# Patient Record
Sex: MALE | Race: WHITE | URBAN - METROPOLITAN AREA
[De-identification: names, ages, dates, MRNs, and addresses within clinical notes are randomized per-mention and may not be internally consistent; named-entity substitution may affect disease eponyms.]

---

## 2017-07-31 RX ORDER — MULTIVITAMIN
1 CAPSULE ORAL DAILY
COMMUNITY
Start: 2007-03-21

## 2017-07-31 RX ORDER — METFORMIN HYDROCHLORIDE 500 MG/1
TABLET, EXTENDED RELEASE ORAL
Qty: 30 TABLET | Refills: 1 | Status: SHIPPED | OUTPATIENT
Start: 2017-07-31 | End: 2017-09-29

## 2017-07-31 RX ORDER — ENALAPRIL MALEATE 10 MG/1
1 TABLET ORAL DAILY
COMMUNITY
Start: 2007-03-21 | End: 2017-08-28

## 2017-07-31 RX ORDER — ATORVASTATIN CALCIUM 80 MG/1
0.5 TABLET, FILM COATED ORAL NIGHTLY
COMMUNITY
Start: 2007-03-21 | End: 2017-10-23

## 2017-07-31 RX ORDER — METFORMIN HYDROCHLORIDE 500 MG/1
1 TABLET, EXTENDED RELEASE ORAL EVERY EVENING
COMMUNITY
Start: 2007-03-21 | End: 2017-10-23

## 2017-07-31 RX ORDER — ATORVASTATIN CALCIUM 80 MG/1
TABLET, FILM COATED ORAL
Qty: 15 TABLET | Refills: 1 | Status: SHIPPED | OUTPATIENT
Start: 2017-07-31 | End: 2017-09-29

## 2017-08-29 RX ORDER — ENALAPRIL MALEATE 10 MG/1
TABLET ORAL
Qty: 30 TABLET | Refills: 0 | Status: SHIPPED | OUTPATIENT
Start: 2017-08-29 | End: 2017-09-29

## 2017-09-30 RX ORDER — METFORMIN HYDROCHLORIDE 500 MG/1
TABLET, EXTENDED RELEASE ORAL
Qty: 30 TABLET | Refills: 0 | Status: SHIPPED | OUTPATIENT
Start: 2017-09-30 | End: 2017-10-23

## 2017-09-30 RX ORDER — ATORVASTATIN CALCIUM 80 MG/1
TABLET, FILM COATED ORAL
Qty: 15 TABLET | Refills: 0 | Status: SHIPPED | OUTPATIENT
Start: 2017-09-30 | End: 2017-10-23

## 2017-09-30 RX ORDER — ENALAPRIL MALEATE 10 MG/1
TABLET ORAL
Qty: 30 TABLET | Refills: 0 | Status: SHIPPED | OUTPATIENT
Start: 2017-09-30 | End: 2017-10-23

## 2017-09-30 NOTE — TELEPHONE ENCOUNTER
All medications sent in for 1 month. Please call him and ask him to make an appointment before further refills will be given.

## 2017-09-30 NOTE — TELEPHONE ENCOUNTER
Last RF-  Enalapril: 8/29/17  Atorvastatin: 7/31/17  Metformin: 7/31/17    Future appt:  none  Last Appointment:  8/12/16  Last labs: 8/9/16 (Lipid Panel, Uric Acid, CMP, CBC, A1C)

## 2017-10-16 ENCOUNTER — TELEPHONE (OUTPATIENT)
Dept: FAMILY MEDICINE CLINIC | Facility: CLINIC | Age: 72
End: 2017-10-16

## 2017-10-16 DIAGNOSIS — E11.9 CONTROLLED TYPE 2 DIABETES MELLITUS WITHOUT COMPLICATION, WITHOUT LONG-TERM CURRENT USE OF INSULIN (HCC): Primary | ICD-10-CM

## 2017-10-16 DIAGNOSIS — I10 HTN (HYPERTENSION), BENIGN: ICD-10-CM

## 2017-10-16 NOTE — TELEPHONE ENCOUNTER
Needs orders for lab work for upcoming px on monday 10/23/17.  Pt wants to be informed once orders are placed so that he can make lab appointment

## 2017-10-16 NOTE — TELEPHONE ENCOUNTER
Patient called and was wondering about the Vitamin D blood test and if the doctor would order it and it get paid by medicare.

## 2017-10-19 ENCOUNTER — LABORATORY ENCOUNTER (OUTPATIENT)
Dept: LAB | Age: 72
End: 2017-10-19
Attending: FAMILY MEDICINE
Payer: MEDICARE

## 2017-10-19 DIAGNOSIS — I10 HTN (HYPERTENSION), BENIGN: ICD-10-CM

## 2017-10-19 DIAGNOSIS — E11.9 CONTROLLED TYPE 2 DIABETES MELLITUS WITHOUT COMPLICATION, WITHOUT LONG-TERM CURRENT USE OF INSULIN (HCC): ICD-10-CM

## 2017-10-19 PROCEDURE — 82570 ASSAY OF URINE CREATININE: CPT

## 2017-10-19 PROCEDURE — 36415 COLL VENOUS BLD VENIPUNCTURE: CPT

## 2017-10-19 PROCEDURE — 82043 UR ALBUMIN QUANTITATIVE: CPT

## 2017-10-19 PROCEDURE — 83036 HEMOGLOBIN GLYCOSYLATED A1C: CPT

## 2017-10-19 PROCEDURE — 80053 COMPREHEN METABOLIC PANEL: CPT

## 2017-10-19 PROCEDURE — 84443 ASSAY THYROID STIM HORMONE: CPT

## 2017-10-19 PROCEDURE — 82306 VITAMIN D 25 HYDROXY: CPT

## 2017-10-19 PROCEDURE — 85025 COMPLETE CBC W/AUTO DIFF WBC: CPT

## 2017-10-19 PROCEDURE — 84550 ASSAY OF BLOOD/URIC ACID: CPT

## 2017-10-19 PROCEDURE — 80061 LIPID PANEL: CPT

## 2017-10-20 ENCOUNTER — TELEPHONE (OUTPATIENT)
Dept: FAMILY MEDICINE CLINIC | Facility: CLINIC | Age: 72
End: 2017-10-20

## 2017-10-20 NOTE — TELEPHONE ENCOUNTER
Patient questioned if lab testing back. Informed yes. Asking if anything very abnormal on testing-informed no. Informed Dr Raquel Gay will review labs further at time of appt on Monday/agreed.   Carson Rucker, 10/20/17, 10:54 AM

## 2017-10-23 ENCOUNTER — OFFICE VISIT (OUTPATIENT)
Dept: FAMILY MEDICINE CLINIC | Facility: CLINIC | Age: 72
End: 2017-10-23

## 2017-10-23 VITALS
RESPIRATION RATE: 16 BRPM | WEIGHT: 210.5 LBS | SYSTOLIC BLOOD PRESSURE: 132 MMHG | DIASTOLIC BLOOD PRESSURE: 78 MMHG | TEMPERATURE: 98 F | HEIGHT: 65 IN | HEART RATE: 86 BPM | BODY MASS INDEX: 35.07 KG/M2

## 2017-10-23 DIAGNOSIS — Z00.00 ENCOUNTER FOR ANNUAL HEALTH EXAMINATION: Primary | ICD-10-CM

## 2017-10-23 DIAGNOSIS — E78.00 PURE HYPERCHOLESTEROLEMIA: ICD-10-CM

## 2017-10-23 DIAGNOSIS — Z12.11 ENCOUNTER FOR SCREENING COLONOSCOPY: ICD-10-CM

## 2017-10-23 DIAGNOSIS — E11.21 DIABETIC NEPHROPATHY ASSOCIATED WITH TYPE 2 DIABETES MELLITUS (HCC): ICD-10-CM

## 2017-10-23 DIAGNOSIS — I10 BENIGN ESSENTIAL HYPERTENSION: ICD-10-CM

## 2017-10-23 DIAGNOSIS — E11.9 CONTROLLED TYPE 2 DIABETES MELLITUS WITHOUT COMPLICATION, WITHOUT LONG-TERM CURRENT USE OF INSULIN (HCC): ICD-10-CM

## 2017-10-23 PROCEDURE — G0439 PPPS, SUBSEQ VISIT: HCPCS | Performed by: FAMILY MEDICINE

## 2017-10-23 PROCEDURE — G0008 ADMIN INFLUENZA VIRUS VAC: HCPCS | Performed by: FAMILY MEDICINE

## 2017-10-23 PROCEDURE — 90653 IIV ADJUVANT VACCINE IM: CPT | Performed by: FAMILY MEDICINE

## 2017-10-23 PROCEDURE — 99214 OFFICE O/P EST MOD 30 MIN: CPT | Performed by: FAMILY MEDICINE

## 2017-10-23 RX ORDER — METFORMIN HYDROCHLORIDE 500 MG/1
500 TABLET, EXTENDED RELEASE ORAL
Qty: 90 TABLET | Refills: 1 | Status: SHIPPED | OUTPATIENT
Start: 2017-10-23 | End: 2018-04-20

## 2017-10-23 RX ORDER — ENALAPRIL MALEATE 10 MG/1
10 TABLET ORAL DAILY
Qty: 90 TABLET | Refills: 1 | Status: SHIPPED | OUTPATIENT
Start: 2017-10-23 | End: 2018-04-20

## 2017-10-23 RX ORDER — ATORVASTATIN CALCIUM 80 MG/1
40 TABLET, FILM COATED ORAL DAILY
Qty: 45 TABLET | Refills: 1 | Status: SHIPPED | OUTPATIENT
Start: 2017-10-23 | End: 2018-04-20

## 2017-10-23 NOTE — PATIENT INSTRUCTIONS
Please schedule colonoscopy with Dr Ruben Guerrero. Thom in 6 months. Recommended Websites for Advanced Directives    [email or web address]. org/publications/Documents/personal_[email or web address]. pdf  An information packet, including necessary form from the [employer]

## 2017-10-23 NOTE — PROGRESS NOTES
[place] [facility] [place]  PROGRESS NOTE  Chief Complaint:   Patient presents with:  Physical      HPI:   This is a 70year old male coming in for his annual physical.  He said that he has been feeling good overall.   He denies any new problems or issue HGB 16.7 13.0 - 17.0 g/dL   HCT 48.7 37.0 - 53.0 %   .0 150.0 - 450.0 10(3)uL   MCV 93.5 80.0 - 99.0 fL   MCH 32.1 27.0 - 33.2 pg   MCHC 34.3 31.0 - 37.0 g/dL   RDW 12.3 11.5 - 16.0 %   RDW-SD 42.5 35.1 - 46.3 fL   Neutrophil Absolute Prelim 3.69 capsule by mouth daily. Disp:  Rfl:       Counseling given: Not Answered       REVIEW OF SYSTEMS:   CONSTITUTIONAL:  Denies unusual weight gain/loss, fever, chills, or fatigue.   EENT:  Eyes:  Denies eye pain, visual loss, blurred vision, double vision or y icterus, conjunctivae clear bilaterally, no eye discharge Ears: External normal. Nose: patent, no nasal discharge Throat:  No tonsillar erythema or exudate. Mouth:  No oral lesions or ulcerations, good dentition.   NECK: Supple, no CLAD, no JVD, no thyrome type 2 diabetes mellitus ([facility] [address]  He does have a history of elevated microalbumin to creatinine ratio. He is on enalapril to treat that. His GFR is greater than 60. Plan: Continue to take the enalapril as ordered. Recheck in 6 months.   Schedule colonos

## 2018-04-20 RX ORDER — METFORMIN HYDROCHLORIDE 500 MG/1
TABLET, EXTENDED RELEASE ORAL
Qty: 30 TABLET | Refills: 1 | Status: SHIPPED | OUTPATIENT
Start: 2018-04-20 | End: 2018-06-18

## 2018-04-20 RX ORDER — ATORVASTATIN CALCIUM 80 MG/1
TABLET, FILM COATED ORAL
Qty: 15 TABLET | Refills: 1 | Status: SHIPPED | OUTPATIENT
Start: 2018-04-20 | End: 2018-06-18

## 2018-04-20 RX ORDER — ENALAPRIL MALEATE 10 MG/1
TABLET ORAL
Qty: 30 TABLET | Refills: 1 | Status: SHIPPED | OUTPATIENT
Start: 2018-04-20 | End: 2018-06-18

## 2018-04-20 NOTE — TELEPHONE ENCOUNTER
Future appt:     Your appointments     Date & Time Appointment Department [facility])    Apr 23, 2018  8:00 AM CDT Laboratory Visit with REF Dong Mealing [facility] ([facility] Ref Lab Arvind Espino)    Apr 25, 2018  8:00 AM CDT Follow up - Extended with Simeon Pierre

## 2018-04-25 ENCOUNTER — MED REC SCAN ONLY (OUTPATIENT)
Dept: FAMILY MEDICINE CLINIC | Facility: CLINIC | Age: 73
End: 2018-04-25

## 2018-04-25 ENCOUNTER — APPOINTMENT (OUTPATIENT)
Dept: LAB | Age: 73
End: 2018-04-25
Attending: FAMILY MEDICINE
Payer: MEDICARE

## 2018-04-25 DIAGNOSIS — E11.9 CONTROLLED TYPE 2 DIABETES MELLITUS WITHOUT COMPLICATION, WITHOUT LONG-TERM CURRENT USE OF INSULIN (HCC): ICD-10-CM

## 2018-04-25 PROCEDURE — 36415 COLL VENOUS BLD VENIPUNCTURE: CPT

## 2018-04-25 PROCEDURE — 80053 COMPREHEN METABOLIC PANEL: CPT

## 2018-04-25 PROCEDURE — 83036 HEMOGLOBIN GLYCOSYLATED A1C: CPT

## 2018-04-30 ENCOUNTER — OFFICE VISIT (OUTPATIENT)
Dept: FAMILY MEDICINE CLINIC | Facility: CLINIC | Age: 73
End: 2018-04-30

## 2018-04-30 VITALS
TEMPERATURE: 97 F | DIASTOLIC BLOOD PRESSURE: 82 MMHG | WEIGHT: 206.19 LBS | HEIGHT: 65.5 IN | RESPIRATION RATE: 16 BRPM | SYSTOLIC BLOOD PRESSURE: 142 MMHG | BODY MASS INDEX: 33.94 KG/M2 | HEART RATE: 84 BPM

## 2018-04-30 DIAGNOSIS — I10 BENIGN ESSENTIAL HYPERTENSION: ICD-10-CM

## 2018-04-30 DIAGNOSIS — E11.21 DIABETIC NEPHROPATHY ASSOCIATED WITH TYPE 2 DIABETES MELLITUS (HCC): ICD-10-CM

## 2018-04-30 DIAGNOSIS — E78.00 PURE HYPERCHOLESTEROLEMIA: ICD-10-CM

## 2018-04-30 DIAGNOSIS — K08.109 EDENTULOUS: ICD-10-CM

## 2018-04-30 DIAGNOSIS — E11.9 CONTROLLED TYPE 2 DIABETES MELLITUS WITHOUT COMPLICATION, WITHOUT LONG-TERM CURRENT USE OF INSULIN (HCC): Primary | ICD-10-CM

## 2018-04-30 PROCEDURE — 99214 OFFICE O/P EST MOD 30 MIN: CPT | Performed by: FAMILY MEDICINE

## 2018-04-30 NOTE — PROGRESS NOTES
[facility] [place]  PROGRESS NOTE  Chief Complaint:   Patient presents with:  Diabetes  Follow - Up      HPI:   This is a 67year old male coming in for diabetes follow-up. He denies any new problems or concerns.     He is not having any problem Mother    • Dementia Mother      Allergies:    No Known Allergies        Current Meds:    Current Outpatient Prescriptions:  METFORMIN HCL  MG Oral Tablet 24 Hr TAKE ONE TABLET BY MOUTH ONE TIME DAILY with breakfast Disp: 30 tablet Rfl: 1   ENALAPRIL °F (36.2 °C) (Tympanic)   Resp 16   Ht 65.5\"   Wt 206 lb 3.2 oz   BMI 33.79 kg/m²  Estimated body mass index is 33.79 kg/m² as calculated from the following:    Height as of this encounter: 65.5\". Weight as of this encounter: 206 lb 3.2 oz.    Vital si at this point. 3. Edentulous  He had his last teeth removed. He is now completely edentulous. 4. Benign essential hypertension  His blood pressure is slightly elevated today but is relatively anxious today as well.     5. Pure hypercholesterolemia  H

## 2018-06-19 RX ORDER — ATORVASTATIN CALCIUM 80 MG/1
40 TABLET, FILM COATED ORAL DAILY
Qty: 45 TABLET | Refills: 3 | Status: SHIPPED | OUTPATIENT
Start: 2018-06-19 | End: 2019-06-26

## 2018-06-19 RX ORDER — METFORMIN HYDROCHLORIDE 500 MG/1
TABLET, EXTENDED RELEASE ORAL
Qty: 90 TABLET | Refills: 3 | Status: SHIPPED | OUTPATIENT
Start: 2018-06-19 | End: 2019-05-28

## 2018-06-19 RX ORDER — ENALAPRIL MALEATE 10 MG/1
TABLET ORAL
Qty: 90 TABLET | Refills: 3 | Status: SHIPPED | OUTPATIENT
Start: 2018-06-19 | End: 2019-05-28

## 2018-06-19 NOTE — TELEPHONE ENCOUNTER
Future appt:     Your appointments     Date & Time Appointment Department [facility])    Oct 25, 2018  8:15 AM CDT Laboratory Visit with REF Franchesca Hicks Reference Lab ([facility] Ref Lab [facility])    Oct 29, 2018  8:00 AM CDT Follow up with Airam August MD

## 2018-07-12 ENCOUNTER — TELEPHONE (OUTPATIENT)
Dept: FAMILY MEDICINE CLINIC | Facility: CLINIC | Age: 73
End: 2018-07-12

## 2018-07-16 NOTE — TELEPHONE ENCOUNTER
Patient looking for new Eye Dr. Sunday Bernalures [facility] Looking for Recommendation. Patient informed Tolna/Ted or Dr Delfino Jacobsen/agrees.

## 2018-10-22 ENCOUNTER — TELEPHONE (OUTPATIENT)
Dept: FAMILY MEDICINE CLINIC | Facility: CLINIC | Age: 73
End: 2018-10-22

## 2018-10-22 DIAGNOSIS — E11.9 CONTROLLED TYPE 2 DIABETES MELLITUS WITHOUT COMPLICATION, WITHOUT LONG-TERM CURRENT USE OF INSULIN (HCC): ICD-10-CM

## 2018-10-22 DIAGNOSIS — K08.109 EDENTULOUS: ICD-10-CM

## 2018-10-22 DIAGNOSIS — E78.00 PURE HYPERCHOLESTEROLEMIA: Primary | ICD-10-CM

## 2018-10-22 DIAGNOSIS — E11.21 DIABETIC NEPHROPATHY ASSOCIATED WITH TYPE 2 DIABETES MELLITUS (HCC): ICD-10-CM

## 2018-10-22 DIAGNOSIS — I10 BENIGN ESSENTIAL HYPERTENSION: ICD-10-CM

## 2018-10-22 DIAGNOSIS — E66.09 CLASS 1 OBESITY DUE TO EXCESS CALORIES WITH SERIOUS COMORBIDITY AND BODY MASS INDEX (BMI) OF 33.0 TO 33.9 IN ADULT: ICD-10-CM

## 2018-10-25 ENCOUNTER — LABORATORY ENCOUNTER (OUTPATIENT)
Dept: LAB | Age: 73
End: 2018-10-25
Attending: FAMILY MEDICINE
Payer: MEDICARE

## 2018-10-25 DIAGNOSIS — E11.21 DIABETIC NEPHROPATHY ASSOCIATED WITH TYPE 2 DIABETES MELLITUS (HCC): ICD-10-CM

## 2018-10-25 DIAGNOSIS — E11.9 CONTROLLED TYPE 2 DIABETES MELLITUS WITHOUT COMPLICATION, WITHOUT LONG-TERM CURRENT USE OF INSULIN (HCC): ICD-10-CM

## 2018-10-25 DIAGNOSIS — I10 BENIGN ESSENTIAL HYPERTENSION: ICD-10-CM

## 2018-10-25 DIAGNOSIS — E66.09 CLASS 1 OBESITY DUE TO EXCESS CALORIES WITH SERIOUS COMORBIDITY AND BODY MASS INDEX (BMI) OF 33.0 TO 33.9 IN ADULT: ICD-10-CM

## 2018-10-25 DIAGNOSIS — E78.00 PURE HYPERCHOLESTEROLEMIA: ICD-10-CM

## 2018-10-25 DIAGNOSIS — K08.109 EDENTULOUS: ICD-10-CM

## 2018-10-25 PROCEDURE — 83036 HEMOGLOBIN GLYCOSYLATED A1C: CPT

## 2018-10-25 PROCEDURE — 82570 ASSAY OF URINE CREATININE: CPT

## 2018-10-25 PROCEDURE — 82306 VITAMIN D 25 HYDROXY: CPT

## 2018-10-25 PROCEDURE — 80061 LIPID PANEL: CPT

## 2018-10-25 PROCEDURE — 36415 COLL VENOUS BLD VENIPUNCTURE: CPT

## 2018-10-25 PROCEDURE — 85025 COMPLETE CBC W/AUTO DIFF WBC: CPT

## 2018-10-25 PROCEDURE — 84550 ASSAY OF BLOOD/URIC ACID: CPT

## 2018-10-25 PROCEDURE — 82043 UR ALBUMIN QUANTITATIVE: CPT

## 2018-10-25 PROCEDURE — 80053 COMPREHEN METABOLIC PANEL: CPT

## 2018-10-25 PROCEDURE — 84443 ASSAY THYROID STIM HORMONE: CPT

## 2018-10-26 ENCOUNTER — TELEPHONE (OUTPATIENT)
Dept: FAMILY MEDICINE CLINIC | Facility: CLINIC | Age: 73
End: 2018-10-26

## 2018-10-26 NOTE — TELEPHONE ENCOUNTER
Per Neema LEHMAN-Labs stable will review at time of visit with Dr Antoine Wheeler.   Alicia Cortez, 10/26/18, 9:27 AM

## 2018-10-26 NOTE — TELEPHONE ENCOUNTER
----- Message from GEOVANY Kebede sent at 10/26/2018  8:37 AM CDT -----  Dr. Isabelle Heck patient–has appointment with him coming up on October 29–can review labs at that time. Please notify patient labs are stable A1c is 6.2.

## 2018-10-29 ENCOUNTER — OFFICE VISIT (OUTPATIENT)
Dept: FAMILY MEDICINE CLINIC | Facility: CLINIC | Age: 73
End: 2018-10-29
Payer: MEDICARE

## 2018-10-29 VITALS
WEIGHT: 207.81 LBS | TEMPERATURE: 98 F | HEART RATE: 88 BPM | HEIGHT: 66.5 IN | DIASTOLIC BLOOD PRESSURE: 74 MMHG | BODY MASS INDEX: 33 KG/M2 | SYSTOLIC BLOOD PRESSURE: 152 MMHG | RESPIRATION RATE: 16 BRPM

## 2018-10-29 DIAGNOSIS — E11.21 DIABETIC NEPHROPATHY ASSOCIATED WITH TYPE 2 DIABETES MELLITUS (HCC): ICD-10-CM

## 2018-10-29 DIAGNOSIS — K08.109 EDENTULOUS: ICD-10-CM

## 2018-10-29 DIAGNOSIS — Z23 NEED FOR VACCINATION: ICD-10-CM

## 2018-10-29 DIAGNOSIS — E11.9 CONTROLLED TYPE 2 DIABETES MELLITUS WITHOUT COMPLICATION, WITHOUT LONG-TERM CURRENT USE OF INSULIN (HCC): Primary | ICD-10-CM

## 2018-10-29 DIAGNOSIS — Z00.00 ENCOUNTER FOR ANNUAL HEALTH EXAMINATION: ICD-10-CM

## 2018-10-29 DIAGNOSIS — E78.00 PURE HYPERCHOLESTEROLEMIA: ICD-10-CM

## 2018-10-29 DIAGNOSIS — I10 BENIGN ESSENTIAL HYPERTENSION: ICD-10-CM

## 2018-10-29 PROCEDURE — G0008 ADMIN INFLUENZA VIRUS VAC: HCPCS | Performed by: FAMILY MEDICINE

## 2018-10-29 PROCEDURE — 99214 OFFICE O/P EST MOD 30 MIN: CPT | Performed by: FAMILY MEDICINE

## 2018-10-29 PROCEDURE — G0439 PPPS, SUBSEQ VISIT: HCPCS | Performed by: FAMILY MEDICINE

## 2018-10-29 PROCEDURE — 90653 IIV ADJUVANT VACCINE IM: CPT | Performed by: FAMILY MEDICINE

## 2018-10-29 RX ORDER — AMLODIPINE BESYLATE 5 MG/1
5 TABLET ORAL DAILY
Qty: 30 TABLET | Refills: 5 | Status: SHIPPED | OUTPATIENT
Start: 2018-10-29

## 2018-10-29 NOTE — PATIENT INSTRUCTIONS
Recommended Websites for Advanced Directives    [email or web address].no. org/publications/Documents/personal_dec. pdf  An information packet, including necessary form from the [address] website. http://[email or web address] [place]state. [place]/public/books/adv

## 2018-10-29 NOTE — PROGRESS NOTES
[facility] [place]  PROGRESS NOTE  Chief Complaint:   Patient presents with:  Physical      HPI:   This is a 67year old male coming in for his annual Medicare wellness exam.  He he said that he has been feeling well.   He denies any new problems VITAMIN D, 25-HYDROXY   Result Value Ref Range    25-Hydroxyvitamin D (Total) 27.3 (L) 30.0 - 100.0 ng/mL   CBC W/ DIFFERENTIAL   Result Value Ref Range    WBC 5.8 4.0 - 13.0 x10(3) uL    RBC 5.13 3.80 - 5.80 x10(6)uL    HGB 16.4 13.0 - 17.0 g/dL    HCT mouth daily. Disp: 30 tablet Rfl: 5   Cholecalciferol (VITAMIN D) 1000 units Oral Tab Take 1,000 Int'l Units by mouth daily.  Disp:  Rfl: 0   ENALAPRIL MALEATE 10 MG Oral Tab TAKE ONE TABLET BY MOUTH ONE TIME DAILY  Disp: 90 tablet Rfl: 3   atorvastatin 80 Pulse 88   Temp 97.5 °F (36.4 °C) (Tympanic)   Resp 16   Ht 66.5\"   Wt 207 lb 12.8 oz   BMI 33.04 kg/m²  Estimated body mass index is 33.04 kg/m² as calculated from the following:    Height as of this encounter: 66.5\".     Weight as of this encounter: 207 with that. 2. Diabetic nephropathy associated with type 2 diabetes mellitus ([facility] [address]  Has diabetic nephropathy. His GFR is normal.  His microalbumin to creatinine ratio is slightly elevated at 104 but it has improved compared to last year.     3. Benign es

## 2018-10-31 ENCOUNTER — TELEPHONE (OUTPATIENT)
Dept: FAMILY MEDICINE CLINIC | Facility: CLINIC | Age: 73
End: 2018-10-31

## 2018-10-31 NOTE — TELEPHONE ENCOUNTER
Patient states Wife had concerns regarding blood sugars and Vitamin D. Assured Patient that His Diabetes is under good control/agrees. States His wife has gotten Vitamin Capsules that He will start taking.   Horace Iglesias, 10/31/18, 9:19 AM

## 2019-01-10 ENCOUNTER — TELEPHONE (OUTPATIENT)
Dept: FAMILY MEDICINE CLINIC | Facility: CLINIC | Age: 74
End: 2019-01-10

## 2019-01-10 NOTE — TELEPHONE ENCOUNTER
pre op notes, ekg and bloodwork if any, needs to be faxed to [phone] after his pre op appointment

## 2019-01-11 ENCOUNTER — OFFICE VISIT (OUTPATIENT)
Dept: FAMILY MEDICINE CLINIC | Facility: CLINIC | Age: 74
End: 2019-01-11
Payer: MEDICARE

## 2019-01-11 VITALS
RESPIRATION RATE: 16 BRPM | SYSTOLIC BLOOD PRESSURE: 132 MMHG | WEIGHT: 206 LBS | HEART RATE: 90 BPM | TEMPERATURE: 98 F | HEIGHT: 66.5 IN | DIASTOLIC BLOOD PRESSURE: 84 MMHG | BODY MASS INDEX: 32.72 KG/M2

## 2019-01-11 DIAGNOSIS — E11.21 DIABETIC NEPHROPATHY ASSOCIATED WITH TYPE 2 DIABETES MELLITUS (HCC): ICD-10-CM

## 2019-01-11 DIAGNOSIS — H25.13 AGE-RELATED NUCLEAR CATARACT OF BOTH EYES: Primary | ICD-10-CM

## 2019-01-11 DIAGNOSIS — I10 BENIGN ESSENTIAL HYPERTENSION: ICD-10-CM

## 2019-01-11 DIAGNOSIS — Z01.818 PREOP EXAMINATION: ICD-10-CM

## 2019-01-11 DIAGNOSIS — E11.9 CONTROLLED TYPE 2 DIABETES MELLITUS WITHOUT COMPLICATION, WITHOUT LONG-TERM CURRENT USE OF INSULIN (HCC): ICD-10-CM

## 2019-01-11 PROCEDURE — 93000 ELECTROCARDIOGRAM COMPLETE: CPT | Performed by: FAMILY MEDICINE

## 2019-01-11 PROCEDURE — 99214 OFFICE O/P EST MOD 30 MIN: CPT | Performed by: FAMILY MEDICINE

## 2019-01-11 NOTE — PROGRESS NOTES
[facility] [place]  PROGRESS NOTE  Chief Complaint:   Patient presents with:  Pre-Op Exam      HPI:   This is a 68year old male coming in for his preoperative examination.   He has bilateral cataracts that will be repaired in [place] on January 1 16.60 mg/dL    Creatinine Ur Random 159.00 mg/dL    Malb/Cre Calc 104.4 (H) <=30.0 ug/mg   ASSAY, THYROID STIM HORMONE   Result Value Ref Range    TSH 3.380 0.350 - 5.500 mIU/mL   URIC ACID, SERUM   Result Value Ref Range    Uric Acid 8.1 2.4 - 8.7 mg/dL History:  Family History   Problem Relation Age of Onset   • Cancer Mother    • Dementia Mother      Allergies:    No Known Allergies        Current Meds:    Current Outpatient Medications:   AmLODIPine Besylate 5 MG Oral Tab Take 1 tablet (5 mg total) by m sweating, cold or heat intolerance, polyuria or polydipsia. ALLERGIES:  Denies allergic response, history of asthma, sneezing, hives, eczema or rhinitis.      EXAM:   /84 (BP Location: Left arm, Patient Position: Sitting, Cuff Size: large)   Pulse 90 long-term current use of insulin ([facility] [address]  He has type 2 diabetes but his blood sugar control is been excellent.  - ELECTROCARDIOGRAM, COMPLETE    4.  Diabetic nephropathy associated with type 2 diabetes mellitus ([address]  He does have mild stage III kidney disea

## 2019-05-28 DIAGNOSIS — E11.9 CONTROLLED TYPE 2 DIABETES MELLITUS WITHOUT COMPLICATION, WITHOUT LONG-TERM CURRENT USE OF INSULIN (HCC): Primary | ICD-10-CM

## 2019-05-29 RX ORDER — METFORMIN HYDROCHLORIDE 500 MG/1
TABLET, EXTENDED RELEASE ORAL
Qty: 90 TABLET | Refills: 1 | Status: SHIPPED | OUTPATIENT
Start: 2019-05-29 | End: 2019-11-16

## 2019-05-29 RX ORDER — ENALAPRIL MALEATE 10 MG/1
TABLET ORAL
Qty: 90 TABLET | Refills: 1 | Status: SHIPPED | OUTPATIENT
Start: 2019-05-29 | End: 2019-11-16

## 2019-06-12 ENCOUNTER — APPOINTMENT (OUTPATIENT)
Dept: LAB | Age: 74
End: 2019-06-12
Attending: FAMILY MEDICINE
Payer: MEDICARE

## 2019-06-12 DIAGNOSIS — E11.9 CONTROLLED TYPE 2 DIABETES MELLITUS WITHOUT COMPLICATION, WITHOUT LONG-TERM CURRENT USE OF INSULIN (HCC): ICD-10-CM

## 2019-06-12 PROCEDURE — 36415 COLL VENOUS BLD VENIPUNCTURE: CPT

## 2019-06-12 PROCEDURE — 83036 HEMOGLOBIN GLYCOSYLATED A1C: CPT

## 2019-06-12 PROCEDURE — 82043 UR ALBUMIN QUANTITATIVE: CPT

## 2019-06-12 PROCEDURE — 82570 ASSAY OF URINE CREATININE: CPT

## 2019-06-12 PROCEDURE — 80053 COMPREHEN METABOLIC PANEL: CPT

## 2019-06-14 ENCOUNTER — OFFICE VISIT (OUTPATIENT)
Dept: FAMILY MEDICINE CLINIC | Facility: CLINIC | Age: 74
End: 2019-06-14
Payer: MEDICARE

## 2019-06-14 VITALS
TEMPERATURE: 97 F | HEART RATE: 70 BPM | RESPIRATION RATE: 16 BRPM | DIASTOLIC BLOOD PRESSURE: 82 MMHG | BODY MASS INDEX: 32.16 KG/M2 | SYSTOLIC BLOOD PRESSURE: 120 MMHG | WEIGHT: 202.5 LBS | HEIGHT: 66.5 IN

## 2019-06-14 DIAGNOSIS — E11.21 DIABETIC NEPHROPATHY ASSOCIATED WITH TYPE 2 DIABETES MELLITUS (HCC): Primary | ICD-10-CM

## 2019-06-14 DIAGNOSIS — Z98.49 HISTORY OF CATARACT SURGERY, UNSPECIFIED LATERALITY: ICD-10-CM

## 2019-06-14 DIAGNOSIS — Z12.5 SCREENING FOR MALIGNANT NEOPLASM OF PROSTATE: ICD-10-CM

## 2019-06-14 DIAGNOSIS — I10 BENIGN ESSENTIAL HYPERTENSION: ICD-10-CM

## 2019-06-14 DIAGNOSIS — E78.00 PURE HYPERCHOLESTEROLEMIA: ICD-10-CM

## 2019-06-14 PROBLEM — H25.13 AGE-RELATED NUCLEAR CATARACT OF BOTH EYES: Status: RESOLVED | Noted: 2019-01-11 | Resolved: 2019-06-14

## 2019-06-14 PROCEDURE — 99214 OFFICE O/P EST MOD 30 MIN: CPT | Performed by: FAMILY MEDICINE

## 2019-06-14 NOTE — PROGRESS NOTES
[address]  PROGRESS NOTE  Chief Complaint:   Patient presents with: Follow - Up  Diabetes      HPI:   This is a 68year old male coming in for follow-up on his diabetes. He said that he feels like he is doing well.   He is not having a 3/21/2007     Past Surgical History:   Procedure Laterality Date   • COLONOSCOPY       Social History:  Social History    Tobacco Use      Smoking status: Former Smoker        Types: Cigarettes        Quit date: 10/23/2002        Years since quittin. 6 stiffness. NEUROLOGICAL:  Denies headache, seizures, dizziness, syncope, paralysis, ataxia, numbness or tingling in the extremities,change in bowel or bladder control. HEMATOLOGIC:  Denies anemia, bleeding or bruising.   LYMPHATICS:  Denies enlarged nodes barefoot skin diabetic exam is normal, visualized feet and the appearance is normal.  Bilateral monofilament/sensation of both feet is normal.  Pulsation pedal pulse exam of both lower legs/feet is normal as well. ASSESSMENT AND PLAN:   1.  Diabetic ne

## 2019-06-26 ENCOUNTER — TELEPHONE (OUTPATIENT)
Dept: FAMILY MEDICINE CLINIC | Facility: CLINIC | Age: 74
End: 2019-06-26

## 2019-06-26 RX ORDER — ATORVASTATIN CALCIUM 80 MG/1
TABLET, FILM COATED ORAL
Qty: 45 TABLET | Refills: 1 | Status: SHIPPED | OUTPATIENT
Start: 2019-06-26 | End: 2019-12-14

## 2019-06-26 NOTE — TELEPHONE ENCOUNTER
Future appt:     Your appointments     Date & Time Appointment Department [facility])    Oct 29, 2019  8:30 AM CDT Follow up with Vick Larsen MD [facility]            [address] [facility]

## 2019-10-25 ENCOUNTER — LABORATORY ENCOUNTER (OUTPATIENT)
Dept: LAB | Age: 74
End: 2019-10-25
Attending: FAMILY MEDICINE
Payer: MEDICARE

## 2019-10-25 DIAGNOSIS — Z12.5 SCREENING FOR MALIGNANT NEOPLASM OF PROSTATE: ICD-10-CM

## 2019-10-25 DIAGNOSIS — I10 BENIGN ESSENTIAL HYPERTENSION: ICD-10-CM

## 2019-10-25 DIAGNOSIS — E11.21 DIABETIC NEPHROPATHY ASSOCIATED WITH TYPE 2 DIABETES MELLITUS (HCC): ICD-10-CM

## 2019-10-25 DIAGNOSIS — E78.00 PURE HYPERCHOLESTEROLEMIA: ICD-10-CM

## 2019-10-25 PROCEDURE — 82043 UR ALBUMIN QUANTITATIVE: CPT

## 2019-10-25 PROCEDURE — 80061 LIPID PANEL: CPT

## 2019-10-25 PROCEDURE — 82570 ASSAY OF URINE CREATININE: CPT

## 2019-10-25 PROCEDURE — 80053 COMPREHEN METABOLIC PANEL: CPT

## 2019-10-25 PROCEDURE — 36415 COLL VENOUS BLD VENIPUNCTURE: CPT

## 2019-10-25 PROCEDURE — 83036 HEMOGLOBIN GLYCOSYLATED A1C: CPT

## 2019-10-25 PROCEDURE — 84443 ASSAY THYROID STIM HORMONE: CPT

## 2019-10-25 PROCEDURE — 84550 ASSAY OF BLOOD/URIC ACID: CPT

## 2019-10-25 PROCEDURE — 85025 COMPLETE CBC W/AUTO DIFF WBC: CPT

## 2019-10-28 ENCOUNTER — TELEPHONE (OUTPATIENT)
Dept: FAMILY MEDICINE CLINIC | Facility: CLINIC | Age: 74
End: 2019-10-28

## 2019-10-28 ENCOUNTER — OFFICE VISIT (OUTPATIENT)
Dept: FAMILY MEDICINE CLINIC | Facility: CLINIC | Age: 74
End: 2019-10-28
Payer: MEDICARE

## 2019-10-28 VITALS
HEIGHT: 66.5 IN | TEMPERATURE: 98 F | HEART RATE: 70 BPM | BODY MASS INDEX: 32.43 KG/M2 | RESPIRATION RATE: 16 BRPM | DIASTOLIC BLOOD PRESSURE: 70 MMHG | OXYGEN SATURATION: 97 % | SYSTOLIC BLOOD PRESSURE: 128 MMHG | WEIGHT: 204.19 LBS

## 2019-10-28 DIAGNOSIS — E78.00 PURE HYPERCHOLESTEROLEMIA: ICD-10-CM

## 2019-10-28 DIAGNOSIS — Z12.11 ENCOUNTER FOR SCREENING COLONOSCOPY: ICD-10-CM

## 2019-10-28 DIAGNOSIS — Z00.00 ENCOUNTER FOR ANNUAL HEALTH EXAMINATION: Primary | ICD-10-CM

## 2019-10-28 DIAGNOSIS — I10 BENIGN ESSENTIAL HYPERTENSION: ICD-10-CM

## 2019-10-28 DIAGNOSIS — E11.21 DIABETIC NEPHROPATHY ASSOCIATED WITH TYPE 2 DIABETES MELLITUS (HCC): ICD-10-CM

## 2019-10-28 DIAGNOSIS — R97.20 ELEVATED PSA: ICD-10-CM

## 2019-10-28 DIAGNOSIS — Z98.49 HISTORY OF CATARACT SURGERY, UNSPECIFIED LATERALITY: ICD-10-CM

## 2019-10-28 DIAGNOSIS — Z23 FLU VACCINE NEED: ICD-10-CM

## 2019-10-28 PROCEDURE — 99213 OFFICE O/P EST LOW 20 MIN: CPT | Performed by: FAMILY MEDICINE

## 2019-10-28 PROCEDURE — G0008 ADMIN INFLUENZA VIRUS VAC: HCPCS | Performed by: FAMILY MEDICINE

## 2019-10-28 PROCEDURE — G0439 PPPS, SUBSEQ VISIT: HCPCS | Performed by: FAMILY MEDICINE

## 2019-10-28 PROCEDURE — 90662 IIV NO PRSV INCREASED AG IM: CPT | Performed by: FAMILY MEDICINE

## 2019-10-28 NOTE — PROGRESS NOTES
[address]  PROGRESS NOTE  Chief Complaint:   Patient presents with: Well Adult      HPI:   This is a 68year old male coming in for his annual wellness exam.  He said that he has been feeling great.   He denies any new problems or chalino GFR, Non- 75 >=60    GFR, -American 87 >=60    AST 22 15 - 37 U/L    ALT 33 16 - 61 U/L    Alkaline Phosphatase 60 45 - 117 U/L    Bilirubin, Total 0.8 0.1 - 2.0 mg/dL    Total Protein 7.1 6.4 - 8.2 g/dL    Albumin 3.8 3.4 - 5.0 g/dL No    Family History:  Family History   Problem Relation Age of Onset   • Cancer Mother    • Dementia Mother      Allergies:    No Known Allergies        Current Meds:  ATORVASTATIN 80 MG Oral Tab, take 1/2 tablet by mouth daily, Disp: 45 tablet, Rfl: 1  M heat intolerance, polyuria or polydipsia. ALLERGIES:  Denies allergic response, history of asthma, sneezing, hives, eczema or rhinitis.      EXAM:   /70 (BP Location: Left arm, Patient Position: Sitting, Cuff Size: adult)   Pulse 70   Temp 98.3 °F (3 well.      ASSESSMENT AND PLAN:   1. Encounter for annual health examination  This is his annual wellness exam.  He is due for colonoscopy. He will contact Dr. Vidhya Grubbs to arrange that. He is due for an influenza vaccine. We will do that today.   He had hi treatments as a result of today.      Problem List:  Patient Active Problem List:     Pure hypercholesterolemia     Benign essential hypertension     Diabetic nephropathy associated with type 2 diabetes mellitus ([facility]     Edentulous     Preop examination

## 2019-10-28 NOTE — TELEPHONE ENCOUNTER
Patient is off today. Questioned if he could come in today. Patient informed there are not openings today. Informed if cancellation, will give him a call.   José Miguel Bills, 10/28/19, 8:42 AM

## 2019-10-28 NOTE — PATIENT INSTRUCTIONS
Recommended Websites for Advanced Directives    SeekAlumni.no. org/publications/Documents/personal_dec. pdf  An information packet, including necessary form from the [address] website. http://www. [place]state. [place]/public/books/adv

## 2019-11-16 NOTE — TELEPHONE ENCOUNTER
Future appt:     Your appointments     Date & Time Appointment Department [facility])    Apr 27, 2020  8:15 AM CDT Laboratory Visit with REF Yaa Spears Reference Lab ([facility] Ref Lab [facility])        Apr 29, 2020  8:00 AM CDT Follow Up Visit with Jenaro Ramos

## 2019-11-18 RX ORDER — ENALAPRIL MALEATE 10 MG/1
TABLET ORAL
Qty: 90 TABLET | Refills: 1 | Status: SHIPPED | OUTPATIENT
Start: 2019-11-18 | End: 2020-05-05

## 2019-11-18 RX ORDER — METFORMIN HYDROCHLORIDE 500 MG/1
TABLET, EXTENDED RELEASE ORAL
Qty: 90 TABLET | Refills: 1 | Status: SHIPPED | OUTPATIENT
Start: 2019-11-18 | End: 2020-05-05

## 2019-12-13 ENCOUNTER — PATIENT OUTREACH (OUTPATIENT)
Dept: FAMILY MEDICINE CLINIC | Facility: CLINIC | Age: 74
End: 2019-12-13

## 2019-12-14 NOTE — TELEPHONE ENCOUNTER
Future appt:     Your appointments     Date & Time Appointment Department [facility])    Apr 27, 2020  8:15 AM CDT Laboratory Visit with REF Martinez Bound [facility] ([facility] Ref [facility])        Apr 29, 2020  8:00 AM CDT Follow Up Visit with Parveen Silverman

## 2019-12-16 RX ORDER — ATORVASTATIN CALCIUM 80 MG/1
TABLET, FILM COATED ORAL
Qty: 45 TABLET | Refills: 1 | Status: SHIPPED | OUTPATIENT
Start: 2019-12-16 | End: 2020-06-01

## 2020-05-05 RX ORDER — ENALAPRIL MALEATE 10 MG/1
TABLET ORAL
Qty: 90 TABLET | Refills: 0 | Status: SHIPPED | OUTPATIENT
Start: 2020-05-05 | End: 2020-08-18

## 2020-05-05 RX ORDER — METFORMIN HYDROCHLORIDE 500 MG/1
TABLET, EXTENDED RELEASE ORAL
Qty: 90 TABLET | Refills: 0 | Status: SHIPPED | OUTPATIENT
Start: 2020-05-05 | End: 2020-08-18

## 2020-05-05 NOTE — TELEPHONE ENCOUNTER
Future appt:    Last Appointment with provider: 10/28/2019    Last appointment at [facility]:  Visit date not found  Cholesterol, Total (mg/dL)   Date Value   10/25/2019 141     HDL Cholesterol (mg/dL)   Date Value   10/25/2019 38 (L)     LDL Cholesterol

## 2020-06-01 RX ORDER — ATORVASTATIN CALCIUM 80 MG/1
TABLET, FILM COATED ORAL
Qty: 45 TABLET | Refills: 1 | Status: SHIPPED | OUTPATIENT
Start: 2020-06-01 | End: 2020-12-21

## 2020-06-01 NOTE — TELEPHONE ENCOUNTER
Future appt:    Last Appointment with provider:   Visit date not found  [place] appointment [place] [facility]:  Visit date not found  Cholesterol, Total (mg/dL)   Date Value   10/25/2019 141     HDL Cholesterol (mg/dL)   Date Value   10/25/2019 38 (L)     LDL C

## 2020-08-18 RX ORDER — METFORMIN HYDROCHLORIDE 500 MG/1
TABLET, EXTENDED RELEASE ORAL
Qty: 90 TABLET | Refills: 1 | Status: SHIPPED | OUTPATIENT
Start: 2020-08-18 | End: 2021-01-30

## 2020-08-18 RX ORDER — ENALAPRIL MALEATE 10 MG/1
TABLET ORAL
Qty: 90 TABLET | Refills: 1 | Status: SHIPPED | OUTPATIENT
Start: 2020-08-18 | End: 2021-01-30

## 2020-08-18 NOTE — TELEPHONE ENCOUNTER
Future appt:    Last Appointment with provider:   10/28/2019    [place] [facility]:  Visit date not found  Cholesterol, Total (mg/dL)   Date Value   10/25/2019 141     HDL Cholesterol (mg/dL)   Date Value   10/25/2019 38 (L)     LDL Cholester

## 2020-12-01 RX ORDER — ATORVASTATIN CALCIUM 80 MG/1
TABLET, FILM COATED ORAL
Qty: 45 TABLET | Refills: 0 | OUTPATIENT
Start: 2020-12-01

## 2020-12-02 ENCOUNTER — TELEPHONE (OUTPATIENT)
Dept: FAMILY MEDICINE CLINIC | Facility: CLINIC | Age: 75
End: 2020-12-02

## 2020-12-02 DIAGNOSIS — I10 BENIGN ESSENTIAL HYPERTENSION: ICD-10-CM

## 2020-12-02 DIAGNOSIS — R97.20 ELEVATED PSA: ICD-10-CM

## 2020-12-02 DIAGNOSIS — Z12.5 ENCOUNTER FOR SCREENING FOR MALIGNANT NEOPLASM OF PROSTATE: ICD-10-CM

## 2020-12-02 DIAGNOSIS — Z00.00 ENCOUNTER FOR ANNUAL HEALTH EXAMINATION: Primary | ICD-10-CM

## 2020-12-02 DIAGNOSIS — E11.9 CONTROLLED TYPE 2 DIABETES MELLITUS WITHOUT COMPLICATION, WITHOUT LONG-TERM CURRENT USE OF INSULIN (HCC): ICD-10-CM

## 2020-12-02 NOTE — TELEPHONE ENCOUNTER
Future appt:     Your appointments     Date & Time Appointment Department [facility])    Dec 18, 2020  8:45 AM CST Laboratory Visit with REF Li Dowell Reference Lab ([facility])        Dec 23, 2020  8:30 AM CST Medicare Annual Well Visit with PILO

## 2020-12-17 ENCOUNTER — LABORATORY ENCOUNTER (OUTPATIENT)
Dept: LAB | Age: 75
End: 2020-12-17
Attending: NURSE PRACTITIONER
Payer: MEDICARE

## 2020-12-17 DIAGNOSIS — Z12.5 ENCOUNTER FOR SCREENING FOR MALIGNANT NEOPLASM OF PROSTATE: ICD-10-CM

## 2020-12-17 DIAGNOSIS — E11.9 CONTROLLED TYPE 2 DIABETES MELLITUS WITHOUT COMPLICATION, WITHOUT LONG-TERM CURRENT USE OF INSULIN (HCC): ICD-10-CM

## 2020-12-17 DIAGNOSIS — Z00.00 ENCOUNTER FOR ANNUAL HEALTH EXAMINATION: ICD-10-CM

## 2020-12-17 DIAGNOSIS — I10 BENIGN ESSENTIAL HYPERTENSION: ICD-10-CM

## 2020-12-17 PROCEDURE — 83036 HEMOGLOBIN GLYCOSYLATED A1C: CPT

## 2020-12-17 PROCEDURE — 36415 COLL VENOUS BLD VENIPUNCTURE: CPT

## 2020-12-17 PROCEDURE — 85025 COMPLETE CBC W/AUTO DIFF WBC: CPT

## 2020-12-17 PROCEDURE — 82570 ASSAY OF URINE CREATININE: CPT

## 2020-12-17 PROCEDURE — 82043 UR ALBUMIN QUANTITATIVE: CPT

## 2020-12-17 PROCEDURE — 84550 ASSAY OF BLOOD/URIC ACID: CPT

## 2020-12-17 PROCEDURE — 80053 COMPREHEN METABOLIC PANEL: CPT

## 2020-12-17 PROCEDURE — 80061 LIPID PANEL: CPT

## 2020-12-21 RX ORDER — ATORVASTATIN CALCIUM 80 MG/1
TABLET, FILM COATED ORAL
Qty: 45 TABLET | Refills: 1 | Status: SHIPPED | OUTPATIENT
Start: 2020-12-21 | End: 2021-06-03

## 2020-12-21 NOTE — TELEPHONE ENCOUNTER
Future appt:     Your appointments     Date & Time Appointment Department [facility])    Dec 23, 2020  8:30 AM CST Medicare Annual Well Visit with Kianna Rousseau MD [address] [facility] [place] ([facility]            [place]

## 2020-12-23 ENCOUNTER — OFFICE VISIT (OUTPATIENT)
Dept: FAMILY MEDICINE CLINIC | Facility: CLINIC | Age: 75
End: 2020-12-23
Payer: MEDICARE

## 2020-12-23 VITALS
TEMPERATURE: 99 F | RESPIRATION RATE: 16 BRPM | HEIGHT: 66.5 IN | OXYGEN SATURATION: 98 % | SYSTOLIC BLOOD PRESSURE: 130 MMHG | DIASTOLIC BLOOD PRESSURE: 70 MMHG | WEIGHT: 203 LBS | HEART RATE: 82 BPM | BODY MASS INDEX: 32.24 KG/M2

## 2020-12-23 DIAGNOSIS — R97.20 ELEVATED PSA: ICD-10-CM

## 2020-12-23 DIAGNOSIS — K08.109 EDENTULOUS: ICD-10-CM

## 2020-12-23 DIAGNOSIS — E78.00 PURE HYPERCHOLESTEROLEMIA: ICD-10-CM

## 2020-12-23 DIAGNOSIS — N40.1 BENIGN PROSTATIC HYPERPLASIA WITH NOCTURIA: ICD-10-CM

## 2020-12-23 DIAGNOSIS — E11.21 DIABETIC NEPHROPATHY ASSOCIATED WITH TYPE 2 DIABETES MELLITUS (HCC): ICD-10-CM

## 2020-12-23 DIAGNOSIS — I10 BENIGN ESSENTIAL HYPERTENSION: ICD-10-CM

## 2020-12-23 DIAGNOSIS — Z00.00 ENCOUNTER FOR ANNUAL HEALTH EXAMINATION: Primary | ICD-10-CM

## 2020-12-23 DIAGNOSIS — R35.1 BENIGN PROSTATIC HYPERPLASIA WITH NOCTURIA: ICD-10-CM

## 2020-12-23 DIAGNOSIS — Z23 FLU VACCINE NEED: ICD-10-CM

## 2020-12-23 DIAGNOSIS — Z98.49 HISTORY OF CATARACT SURGERY, UNSPECIFIED LATERALITY: ICD-10-CM

## 2020-12-23 PROBLEM — Z01.818 PREOP EXAMINATION: Status: RESOLVED | Noted: 2019-01-11 | Resolved: 2020-12-23

## 2020-12-23 PROCEDURE — 90662 IIV NO PRSV INCREASED AG IM: CPT | Performed by: FAMILY MEDICINE

## 2020-12-23 PROCEDURE — G0008 ADMIN INFLUENZA VIRUS VAC: HCPCS | Performed by: FAMILY MEDICINE

## 2020-12-23 PROCEDURE — 99213 OFFICE O/P EST LOW 20 MIN: CPT | Performed by: FAMILY MEDICINE

## 2020-12-23 PROCEDURE — G0439 PPPS, SUBSEQ VISIT: HCPCS | Performed by: FAMILY MEDICINE

## 2020-12-23 NOTE — PATIENT INSTRUCTIONS
Recommended Websites for Advanced Directives    SeekAlumni.no. org/publications/Documents/personal_dec. pdf  An information packet, including necessary form from the [address] website. http://www. [place]. [place]/public/books/adv

## 2020-12-23 NOTE — PROGRESS NOTES
REASON FOR VISIT:    Elton Napier is a 76year old male who presents for a Medicare Annual Wellness visit. He feels very good overall. He denies any new problems or concerns. He is not having any high or low blood sugar reactions.     He s and Cr Latest Ref Rng & Units 12/17/2020 10/25/2019 6/12/2019 10/25/2018 4/25/2018 10/19/2017   BUN 7 - 18 mg/dL 22(H) 18 19(H) 17 15 16   Creatinine 0.70 - 1.30 mg/dL 1.10 0.99 1.14 0.95 0.93 0.90     AST and ALT Latest Ref Rng & Units 12/17/2020 10/25/20 last two weeks)?: Not at all  Feeling down, depressed, or hopeless (over the last two weeks)?: Not at all  PHQ-2 SCORE: 0        Advance Directives     Do you have a healthcare power of ?: Yes  Do you have a living will?: No     Cognitive Assessmen 12/13/2019   Last Dilated Eye Exam 12/11/2019              ALLERGIES:     No Known Allergies        MEDICAL INFORMATION:   Past Medical History:   Diagnosis Date   • Age-related nuclear cataract of both eyes 1/11/2019   • Anxiety    • Benign essential hype headaches  PSYCHE: denies depression or anxiety  HEMATOLOGIC: denies hx of anemia  ENDOCRINE: denies thyroid history  ALL/ASTHMA: denies hx of allergy or asthma    EXAM:   /70   Pulse 82   Temp 98.6 °F (37 °C) (Temporal)   Resp 16   Ht 5' 6.5\" (1.68 diabetes medication.  - OFFICE/OUTPT VISIT,ESTHER OMER III  - COMP METABOLIC PANEL (14); Future  - HEMOGLOBIN A1C; Future    3. Benign essential hypertension  His blood pressure is well controlled.   Continue the same blood pressure medication.  - OFFICE/OUTPT

## 2020-12-24 ENCOUNTER — TELEPHONE (OUTPATIENT)
Dept: FAMILY MEDICINE CLINIC | Facility: CLINIC | Age: 75
End: 2020-12-24

## 2020-12-24 NOTE — TELEPHONE ENCOUNTER
Referral for adelina Juan MD placed at appt yesterday. Patient states he contact the office for an appt but they stated they had not received a referral yet. Referral faxed.

## 2021-01-29 NOTE — TELEPHONE ENCOUNTER
Future appt:     Last Appointment with provider:   12/23/2020; Return in 6 months (on 6/23/2021).     Last appointment at [facility]:  12/23/2020  Cholesterol, Total (mg/dL)   Date Value   12/17/2020 149     HDL Cholesterol (mg/dL)   Date Value   12/17/20

## 2021-01-30 RX ORDER — METFORMIN HYDROCHLORIDE 500 MG/1
TABLET, EXTENDED RELEASE ORAL
Qty: 90 TABLET | Refills: 1 | Status: SHIPPED | OUTPATIENT
Start: 2021-01-30 | End: 2021-07-16

## 2021-01-30 RX ORDER — ENALAPRIL MALEATE 10 MG/1
TABLET ORAL
Qty: 90 TABLET | Refills: 1 | Status: SHIPPED | OUTPATIENT
Start: 2021-01-30 | End: 2021-07-16

## 2021-02-03 DIAGNOSIS — Z23 NEED FOR VACCINATION: ICD-10-CM

## 2021-03-15 ENCOUNTER — OFFICE VISIT (OUTPATIENT)
Dept: FAMILY MEDICINE CLINIC | Facility: CLINIC | Age: 76
End: 2021-03-15
Payer: MEDICARE

## 2021-03-15 ENCOUNTER — TELEPHONE (OUTPATIENT)
Dept: FAMILY MEDICINE CLINIC | Facility: CLINIC | Age: 76
End: 2021-03-15

## 2021-03-15 VITALS
OXYGEN SATURATION: 95 % | BODY MASS INDEX: 33.45 KG/M2 | TEMPERATURE: 98 F | WEIGHT: 210.63 LBS | RESPIRATION RATE: 16 BRPM | HEART RATE: 81 BPM | DIASTOLIC BLOOD PRESSURE: 70 MMHG | SYSTOLIC BLOOD PRESSURE: 130 MMHG | HEIGHT: 66.5 IN

## 2021-03-15 DIAGNOSIS — H61.22 HEARING LOSS DUE TO CERUMEN IMPACTION, LEFT: Primary | ICD-10-CM

## 2021-03-15 PROBLEM — C61 PROSTATE CANCER (HCC): Status: ACTIVE | Noted: 2021-02-02

## 2021-03-15 PROCEDURE — 99212 OFFICE O/P EST SF 10 MIN: CPT | Performed by: NURSE PRACTITIONER

## 2021-03-15 NOTE — PROGRESS NOTES
CHIEF COMPLAINT:   Patient presents with:  Ear Wax: left ear. HPI:   Kayleigh Dinh is a 76year old male who presents to clinic today with complaints of cerumen impaction. Patient noticed left ear fullness and wax build up.   Noticed he Alcohol use: No      Drug use: No    Other Topics      Concerns:        Seat Belt: Yes    Social Determinants of Health  Financial Resource Strain:       Difficulty of Paying Living Expenses:   Food Insecurity:       Worried About Running Out of Food in th hydrogen peroxide  Left ear irrigated with warm water, moderate amount of cerumen removed with irrigation and ear loop. Resolution of hearing difficulties with cerumen removal; patient tolerated procedure well.      ASSESSMENT AND PLAN:   Fernando Bangura

## 2021-03-15 NOTE — TELEPHONE ENCOUNTER
Patient states he is needing ear wax removed. Appt given today with Val Guzman for wax removal.  Shane Storm, 03/15/21, 10:45 AM    Future appt:     Your appointments     Date & Time Appointment Department [facility])    Mar 15, 2021  1:00 PM CDT Exam - Layla Azar

## 2021-06-03 DIAGNOSIS — E78.00 PURE HYPERCHOLESTEROLEMIA: Primary | ICD-10-CM

## 2021-06-03 RX ORDER — ATORVASTATIN CALCIUM 80 MG/1
TABLET, FILM COATED ORAL
Qty: 45 TABLET | Refills: 1 | Status: SHIPPED | OUTPATIENT
Start: 2021-06-03 | End: 2021-11-29

## 2021-06-03 NOTE — TELEPHONE ENCOUNTER
Future appt:     Last Appointment with provider:   12/23/2020; Return in 6 months (on 6/23/2021).     Last appointment at [facility]:  3/15/2021  Cholesterol, Total (mg/dL)   Date Value   12/17/2020 149     HDL Cholesterol (mg/dL)   Date Value   12/17/202

## 2021-06-03 NOTE — TELEPHONE ENCOUNTER
Future Appointments   Date Time Provider Orlando Katharina   6/21/2021  9:00 AM REF [place] [facility]   6/23/2021  8:30 AM Vipul Liu MD [facility] [place] [facility]

## 2021-06-21 ENCOUNTER — LAB ENCOUNTER (OUTPATIENT)
Dept: LAB | Age: 76
End: 2021-06-21
Attending: FAMILY MEDICINE
Payer: MEDICARE

## 2021-06-21 DIAGNOSIS — E78.00 PURE HYPERCHOLESTEROLEMIA: ICD-10-CM

## 2021-06-21 DIAGNOSIS — E11.21 DIABETIC NEPHROPATHY ASSOCIATED WITH TYPE 2 DIABETES MELLITUS (HCC): ICD-10-CM

## 2021-06-21 DIAGNOSIS — I10 BENIGN ESSENTIAL HYPERTENSION: ICD-10-CM

## 2021-06-21 PROCEDURE — 36415 COLL VENOUS BLD VENIPUNCTURE: CPT

## 2021-06-21 PROCEDURE — 83036 HEMOGLOBIN GLYCOSYLATED A1C: CPT

## 2021-06-21 PROCEDURE — 80053 COMPREHEN METABOLIC PANEL: CPT

## 2021-06-23 ENCOUNTER — OFFICE VISIT (OUTPATIENT)
Dept: FAMILY MEDICINE CLINIC | Facility: CLINIC | Age: 76
End: 2021-06-23
Payer: MEDICARE

## 2021-06-23 VITALS
HEIGHT: 66.5 IN | DIASTOLIC BLOOD PRESSURE: 70 MMHG | HEART RATE: 71 BPM | OXYGEN SATURATION: 96 % | BODY MASS INDEX: 33.29 KG/M2 | TEMPERATURE: 98 F | RESPIRATION RATE: 16 BRPM | WEIGHT: 209.63 LBS | SYSTOLIC BLOOD PRESSURE: 132 MMHG

## 2021-06-23 DIAGNOSIS — E11.21 DIABETIC NEPHROPATHY ASSOCIATED WITH TYPE 2 DIABETES MELLITUS (HCC): Primary | ICD-10-CM

## 2021-06-23 DIAGNOSIS — E78.00 PURE HYPERCHOLESTEROLEMIA: ICD-10-CM

## 2021-06-23 DIAGNOSIS — C61 PROSTATE CANCER (HCC): ICD-10-CM

## 2021-06-23 DIAGNOSIS — I10 BENIGN ESSENTIAL HYPERTENSION: ICD-10-CM

## 2021-06-23 DIAGNOSIS — Z12.5 ENCOUNTER FOR SCREENING FOR MALIGNANT NEOPLASM OF PROSTATE: ICD-10-CM

## 2021-06-23 PROBLEM — Z98.49 HISTORY OF CATARACT SURGERY, UNSPECIFIED LATERALITY: Status: RESOLVED | Noted: 2019-06-14 | Resolved: 2021-06-23

## 2021-06-23 PROBLEM — R35.1 BENIGN PROSTATIC HYPERPLASIA WITH NOCTURIA: Status: RESOLVED | Noted: 2020-12-23 | Resolved: 2021-06-23

## 2021-06-23 PROBLEM — N40.1 BENIGN PROSTATIC HYPERPLASIA WITH NOCTURIA: Status: RESOLVED | Noted: 2020-12-23 | Resolved: 2021-06-23

## 2021-06-23 PROCEDURE — 99214 OFFICE O/P EST MOD 30 MIN: CPT | Performed by: FAMILY MEDICINE

## 2021-06-23 NOTE — PROGRESS NOTES
[address] [place] [address]  PROGRESS NOTE  Chief Complaint:   Patient presents with: Follow - Up: 6 months       HPI:   This is a 76year old male coming in for 6-month follow-up on his diabetes. He reports that he has been doing well.   He is not havi 3/21/2007   • Essential hypertension    • Hyperlipidemia    • Pure hypercholesterolemia 3/21/2007     Past Surgical History:   Procedure Laterality Date   • COLONOSCOPY       Social History:  Social History    Tobacco Use      Smoking status: Former Smoker Denies weakness, muscle aches, back pain, joint pain, swelling or stiffness. NEUROLOGICAL:  Denies headache, seizures, dizziness, syncope, paralysis, ataxia, numbness or tingling in the extremities,change in bowel or bladder control.   HEMATOLOGIC:  Denies monofilament/sensation of both feet is normal.  Pulsation pedal pulse exam of both lower legs/feet is normal as well. ASSESSMENT AND PLAN:   1. Diabetic nephropathy associated with type 2 diabetes mellitus ([address]  He has type 2 diabetes.   He is doing v SCREEN; Future      Meds & Refills for this Visit:  Requested Prescriptions      No prescriptions requested or ordered in this encounter       Health Maintenance:  FIT/FOBT Colorectal Screening Never done  Zoster Vaccines(1 of 2) Never done  Colonoscopy du

## 2021-07-15 NOTE — TELEPHONE ENCOUNTER
Future appt:     Last Appointment with provider:   6/23/2021- advised Return in about 6 months (around 12/23/2021).     Last refill: 1/30/21-  Enalapril maleate 10 mg #90 1 refill  Metformin HCL  mg #90 1 refill      Cholesterol, Total (mg/dL)   Date

## 2021-07-16 RX ORDER — ENALAPRIL MALEATE 10 MG/1
10 TABLET ORAL DAILY
Qty: 90 TABLET | Refills: 1 | Status: SHIPPED | OUTPATIENT
Start: 2021-07-16 | End: 2021-12-28

## 2021-07-16 RX ORDER — METFORMIN HYDROCHLORIDE 500 MG/1
500 TABLET, EXTENDED RELEASE ORAL
Qty: 90 TABLET | Refills: 1 | Status: SHIPPED | OUTPATIENT
Start: 2021-07-16 | End: 2021-12-28

## 2021-08-04 ENCOUNTER — TELEPHONE (OUTPATIENT)
Dept: FAMILY MEDICINE CLINIC | Facility: CLINIC | Age: 76
End: 2021-08-04

## 2021-08-04 NOTE — TELEPHONE ENCOUNTER
I am sorry that it was an unsatisfactory visit. We can certainly refer to a different urologist if desired.

## 2021-08-04 NOTE — TELEPHONE ENCOUNTER
Patient called to express his dissatisfaction with his office visit with Marily Hernandez yesterday. Patient informed that he can keep Marily Hernandez locally as his Urologist or he can be referred to Urology in  [facility].     Patient stated he is just unhappy with

## 2021-10-18 ENCOUNTER — TELEPHONE (OUTPATIENT)
Dept: FAMILY MEDICINE CLINIC | Facility: CLINIC | Age: 76
End: 2021-10-18

## 2021-11-29 DIAGNOSIS — E78.00 PURE HYPERCHOLESTEROLEMIA: ICD-10-CM

## 2021-11-29 RX ORDER — ATORVASTATIN CALCIUM 80 MG/1
TABLET, FILM COATED ORAL
Qty: 45 TABLET | Refills: 1 | Status: SHIPPED | OUTPATIENT
Start: 2021-11-29

## 2021-11-29 NOTE — TELEPHONE ENCOUNTER
Future appt:     Your appointments     Date & Time Appointment Department [facility])    Dec 21, 2021  8:45 AM CST Laboratory Visit with REF Martinez [facility] ([facility])        Dec 29, 2021  9:00 AM CST Medicare Annual Well Visit with PILO

## 2021-12-20 ENCOUNTER — TELEPHONE (OUTPATIENT)
Dept: FAMILY MEDICINE CLINIC | Facility: CLINIC | Age: 76
End: 2021-12-20

## 2021-12-20 NOTE — TELEPHONE ENCOUNTER
has some questions regarding his lab appointment tomorrow  Future Appointments   Date Time Provider Orlando Posadas   12/21/2021  8:45 AM REF [place] [facility]F [facility]   12/29/2021  9:00 AM Mikala Grullon MD [facility] [place] [facility]

## 2021-12-20 NOTE — TELEPHONE ENCOUNTER
Patient questioned if he was to have a urine test done tomorrow. Informed, yes.   Horace Iglesias CMA, 12/20/21, 2:30 PM

## 2021-12-27 ENCOUNTER — LAB ENCOUNTER (OUTPATIENT)
Dept: LAB | Age: 76
End: 2021-12-27
Attending: FAMILY MEDICINE
Payer: MEDICARE

## 2021-12-27 DIAGNOSIS — Z12.5 ENCOUNTER FOR SCREENING FOR MALIGNANT NEOPLASM OF PROSTATE: ICD-10-CM

## 2021-12-27 DIAGNOSIS — E78.00 PURE HYPERCHOLESTEROLEMIA: ICD-10-CM

## 2021-12-27 DIAGNOSIS — I10 BENIGN ESSENTIAL HYPERTENSION: ICD-10-CM

## 2021-12-27 DIAGNOSIS — E11.21 DIABETIC NEPHROPATHY ASSOCIATED WITH TYPE 2 DIABETES MELLITUS (HCC): ICD-10-CM

## 2021-12-27 DIAGNOSIS — C61 PROSTATE CANCER (HCC): ICD-10-CM

## 2021-12-27 PROCEDURE — 36415 COLL VENOUS BLD VENIPUNCTURE: CPT

## 2021-12-27 PROCEDURE — 83036 HEMOGLOBIN GLYCOSYLATED A1C: CPT

## 2021-12-27 PROCEDURE — 84443 ASSAY THYROID STIM HORMONE: CPT

## 2021-12-27 PROCEDURE — 82043 UR ALBUMIN QUANTITATIVE: CPT

## 2021-12-27 PROCEDURE — 82570 ASSAY OF URINE CREATININE: CPT

## 2021-12-27 PROCEDURE — 85025 COMPLETE CBC W/AUTO DIFF WBC: CPT

## 2021-12-27 PROCEDURE — 80053 COMPREHEN METABOLIC PANEL: CPT

## 2021-12-27 PROCEDURE — 84550 ASSAY OF BLOOD/URIC ACID: CPT

## 2021-12-27 PROCEDURE — 80061 LIPID PANEL: CPT

## 2021-12-28 RX ORDER — ENALAPRIL MALEATE 10 MG/1
TABLET ORAL
Qty: 90 TABLET | Refills: 1 | Status: SHIPPED | OUTPATIENT
Start: 2021-12-28

## 2021-12-28 RX ORDER — METFORMIN HYDROCHLORIDE 500 MG/1
TABLET, EXTENDED RELEASE ORAL
Qty: 90 TABLET | Refills: 1 | Status: SHIPPED | OUTPATIENT
Start: 2021-12-28

## 2021-12-28 NOTE — TELEPHONE ENCOUNTER
Future appt:     Your appointments     Date & Time Appointment Department [facility])    Dec 29, 2021  9:00 AM CST Medicare Annual Well Visit with Hansel Leventhal, MD [facility] [place] ([facility]            [place]

## 2021-12-29 ENCOUNTER — OFFICE VISIT (OUTPATIENT)
Dept: FAMILY MEDICINE CLINIC | Facility: CLINIC | Age: 76
End: 2021-12-29
Payer: MEDICARE

## 2021-12-29 VITALS
BODY MASS INDEX: 31.73 KG/M2 | DIASTOLIC BLOOD PRESSURE: 70 MMHG | HEART RATE: 92 BPM | HEIGHT: 66.5 IN | WEIGHT: 199.81 LBS | SYSTOLIC BLOOD PRESSURE: 158 MMHG | RESPIRATION RATE: 16 BRPM | TEMPERATURE: 99 F

## 2021-12-29 DIAGNOSIS — E11.21 DIABETIC NEPHROPATHY ASSOCIATED WITH TYPE 2 DIABETES MELLITUS (HCC): ICD-10-CM

## 2021-12-29 DIAGNOSIS — R97.20 ELEVATED PSA: ICD-10-CM

## 2021-12-29 DIAGNOSIS — C61 PROSTATE CANCER (HCC): ICD-10-CM

## 2021-12-29 DIAGNOSIS — Z00.00 ENCOUNTER FOR ANNUAL HEALTH EXAMINATION: Primary | ICD-10-CM

## 2021-12-29 DIAGNOSIS — I10 BENIGN ESSENTIAL HYPERTENSION: ICD-10-CM

## 2021-12-29 DIAGNOSIS — E78.00 PURE HYPERCHOLESTEROLEMIA: ICD-10-CM

## 2021-12-29 DIAGNOSIS — M25.512 ACUTE PAIN OF LEFT SHOULDER: ICD-10-CM

## 2021-12-29 DIAGNOSIS — Z12.11 SCREENING FOR COLON CANCER: ICD-10-CM

## 2021-12-29 DIAGNOSIS — Z23 FLU VACCINE NEED: ICD-10-CM

## 2021-12-29 DIAGNOSIS — K08.109 EDENTULOUS: ICD-10-CM

## 2021-12-29 DIAGNOSIS — S46.012D TRAUMATIC INCOMPLETE TEAR OF LEFT ROTATOR CUFF, SUBSEQUENT ENCOUNTER: ICD-10-CM

## 2021-12-29 PROBLEM — S46.012A TRAUMATIC INCOMPLETE TEAR OF LEFT ROTATOR CUFF: Status: ACTIVE | Noted: 2021-12-29

## 2021-12-29 PROCEDURE — 90662 IIV NO PRSV INCREASED AG IM: CPT | Performed by: FAMILY MEDICINE

## 2021-12-29 PROCEDURE — 99213 OFFICE O/P EST LOW 20 MIN: CPT | Performed by: FAMILY MEDICINE

## 2021-12-29 PROCEDURE — G0008 ADMIN INFLUENZA VIRUS VAC: HCPCS | Performed by: FAMILY MEDICINE

## 2021-12-29 PROCEDURE — G0439 PPPS, SUBSEQ VISIT: HCPCS | Performed by: FAMILY MEDICINE

## 2021-12-29 NOTE — PROGRESS NOTES
REASON FOR VISIT:    Roby Matos is a 68year old male who presents for a Medicare Annual Wellness visit. He is here for his annual wellness visit. He feels like he is doing well overall.       He sustained an injury to his left shoulder s 4/25/2018   Glucose 70 - 99 mg/dL 107(H) 120(H) 116(H) 128(H) 115(H) 113(H) 114(H)     Cholesterol Latest Ref Rng & Units 12/27/2021 12/17/2020 10/25/2019 10/25/2018 10/19/2017   Total Cholesterol <200 mg/dL 139 149 141 138 145   Triglycerides 30 - 149 mg/ medications?: Yes  Hearing Problems?: No     Functional Status     Hearing Problems?: No  Vision Problems? : No  Difficulty walking?: No  Difficulty dressing or bathing?: No  Problems with daily activities? : No  Memory Problems?: No      Fall/Risk Assessm 6.2 (H)       Creat/alb ratio  Annually Creatinine (mg/dL)   Date Value   12/27/2021 0.81       LDL  Annually LDL Cholesterol (mg/dL)   Date Value   12/27/2021 74       Dilated Eye exam  Annually Data entered on: 12/13/2019   Last Dilated Eye Exam 12/11/20 unusual skin lesions  EYES: denies blurred vision or double vision  HEENT: denies nasal congestion, sinus pain or ST  LUNGS: denies shortness of breath with exertion  CARDIOVASCULAR: denies chest pain on exertion  GI: denies abdominal pain, denies heartbur lower legs/feet is normal as well. ASSESSMENT AND OTHER RELEVANT CHRONIC CONDITIONS:   Thomas Power is a 68year old male who presents for a Medicare Assessment. PLAN SUMMARY:   1.  Encounter for annual health examination  This is his an is due for influenza vaccine today.  - FLU VACC HIGH DOSE PRSV FREE    11. Screening for colon cancer  He is due for colon cancer screening.  - GASTRO - EXTERNAL     The patient indicates understanding of these issues and agrees to the plan.   The patient i

## 2021-12-29 NOTE — PATIENT INSTRUCTIONS
Continue the same medications. Recheck in 6 months. Recommended Websites for Advanced Directives    [email or web address]. org/publications/Documents/personal_[email or web address]. pdf  An information packet, including necessary form from the [facility]

## 2022-02-16 ENCOUNTER — TELEPHONE (OUTPATIENT)
Dept: FAMILY MEDICINE CLINIC | Facility: CLINIC | Age: 77
End: 2022-02-16

## 2022-02-16 RX ORDER — DIAZEPAM 10 MG/1
10 TABLET ORAL
Qty: 1 TABLET | Refills: 0 | Status: SHIPPED | OUTPATIENT
Start: 2022-02-16 | End: 2022-02-16

## 2022-02-16 NOTE — TELEPHONE ENCOUNTER
I will send in a prescription for Valium 10 mg. Please take it 1 hour prior to the MRI. He will need to have a  take him to the MRI facility and stay with him and then bring him back home again.

## 2022-02-16 NOTE — TELEPHONE ENCOUNTER
Please advise Rx for Valium--MRI. Future appt: Your appointments     Date & Time Appointment Department [facility])    Jun 24, 2022  8:30 AM CDT Laboratory Visit with REF David Beverage [facility] ([facility] [place])        Jun 29, 2022  9:00 AM CDT Follow Up Visit with Margot Lang MD 25 [facility] ([facility])            25 [facility]  [address] [address] [phone]  [phone] Kaelyn Europe Reference [facility]  EDW Ref Lab [place]  [address] [address] [address]  [phone]        Last Appointment with provider:   12/29/2021  Last appointment at [facility] [place]:  12/29/2021  Cholesterol, Total (mg/dL)   Date Value   12/27/2021 139     HDL Cholesterol (mg/dL)   Date Value   12/27/2021 37 (L)     LDL Cholesterol (mg/dL)   Date Value   12/27/2021 74     Triglycerides (mg/dL)   Date Value   12/27/2021 161 (H)     Lab Results   Component Value Date     (H) 12/27/2021    A1C 6.2 (H) 12/27/2021     Lab Results   Component Value Date    TSH 1.960 12/27/2021       No follow-ups on file.

## 2022-02-16 NOTE — TELEPHONE ENCOUNTER
needs valium called to [address] [place] [address] in [facility] for MRI appt

## 2022-03-16 ENCOUNTER — PATIENT OUTREACH (OUTPATIENT)
Dept: FAMILY MEDICINE CLINIC | Facility: CLINIC | Age: 77
End: 2022-03-16

## 2022-03-16 NOTE — PROGRESS NOTES
Pt called back and did not want to schedule - I informed him that Dr. Venecia Frankel may be fully booked for the year by the time pt comes in June but pt wants to wait until his follow visit is done to schedule his phys.

## 2022-04-29 ENCOUNTER — PATIENT OUTREACH (OUTPATIENT)
Dept: FAMILY MEDICINE CLINIC | Facility: CLINIC | Age: 77
End: 2022-04-29

## 2022-05-24 DIAGNOSIS — E78.00 PURE HYPERCHOLESTEROLEMIA: ICD-10-CM

## 2022-05-24 RX ORDER — ATORVASTATIN CALCIUM 80 MG/1
TABLET, FILM COATED ORAL
Qty: 45 TABLET | Refills: 1 | Status: SHIPPED | OUTPATIENT
Start: 2022-05-24

## 2022-05-24 NOTE — TELEPHONE ENCOUNTER
Future appt: Your appointments     Date & Time Appointment Department [facility])    Jun 24, 2022  8:30 AM CDT Laboratory Visit with REF Red Barges [facility] ([facility] [place])        Jun 29, 2022  9:00 AM CDT Follow Up Visit with Jcarlos Blanco MD 25 [facility] [place] ([facility])        Dec 28, 2022 10:30 AM [facility] Medicare Annual Well Visit with Jcarlos Blanco MD 25 [facility])            25 [facility]  [place] [facility] [phone]  [facility]  ED Ref Lab [place]  [address] [phone]  [phone]        Last Appointment with provider:   12/29/2021 for annual physical.  Last appointment at [facility]:  12/29/2021  Cholesterol, Total (mg/dL)   Date Value   12/27/2021 139     HDL Cholesterol (mg/dL)   Date Value   12/27/2021 37 (L)     LDL Cholesterol (mg/dL)   Date Value   12/27/2021 74     Triglycerides (mg/dL)   Date Value   12/27/2021 161 (H)     Lab Results   Component Value Date     (H) 12/27/2021    A1C 6.2 (H) 12/27/2021     Lab Results   Component Value Date    TSH 1.960 12/27/2021       No follow-ups on file.

## 2022-06-13 RX ORDER — ENALAPRIL MALEATE 10 MG/1
TABLET ORAL
Qty: 90 TABLET | Refills: 1 | Status: SHIPPED | OUTPATIENT
Start: 2022-06-13

## 2022-06-13 NOTE — TELEPHONE ENCOUNTER
Enalapril: 12/28/21     Return in about 6 months (around 6/29/2022). Future appt: Your appointments     Date & Time Appointment Department [facility])    Jun 24, 2022  8:30 AM CDT Laboratory Visit with REF Red Barges [facility] ([facility] [place])        Jun 29, 2022  9:00 AM CDT Follow Up Visit with [facility])        Dec 28, 2022 10:30 AM [facility] Medicare Annual Well Visit with J[facility]los [facility]anco [facility])            25 [facility]  [address] [phone]  [facility] Reference Lab  EDW Ref Lab [place]  [address] [address]  [phone]        Last Appointment with provider:   12/29/2021  Last appointment at [facility]:  12/29/2021  Cholesterol, Total (mg/dL)   Date Value   12/27/2021 139     HDL Cholesterol (mg/dL)   Date Value   12/27/2021 37 (L)     LDL Cholesterol (mg/dL)   Date Value   12/27/2021 74     Triglycerides (mg/dL)   Date Value   12/27/2021 161 (H)     Lab Results   Component Value Date     (H) 12/27/2021    A1C 6.2 (H) 12/27/2021     Lab Results   Component Value Date    TSH 1.960 12/27/2021       No follow-ups on file.

## 2022-06-23 ENCOUNTER — TELEPHONE (OUTPATIENT)
Dept: FAMILY MEDICINE CLINIC | Facility: CLINIC | Age: 77
End: 2022-06-23

## 2022-06-23 LAB — AMB EXT COVID-19 RESULT: DETECTED

## 2022-06-23 NOTE — TELEPHONE ENCOUNTER
Patient c/o low grade fever and dry cough that started yesterday. States he went to [facility] this morning and tested Positive for Covid. Patient states he is doing OK and is not c/o of any SOB. Patient wondering if he should r/s his appt for next week? OTW. Please advise.      Future Appointments   Date Time Provider [place] Posadas   6/29/2022  9:00 AM Sirisha Trevino MD [facility] [place] [facility] [place]   12/28/2022 10:30 AM Sirisha Trevino MD [facility] [place] [place]

## 2022-06-24 ENCOUNTER — TELEPHONE (OUTPATIENT)
Dept: FAMILY MEDICINE CLINIC | Facility: CLINIC | Age: 77
End: 2022-06-24

## 2022-06-24 NOTE — TELEPHONE ENCOUNTER
Wife Nohemi Rm informed of below as she had been  On the speaker phone with patient during past  Conversations regarding Paxlovid. Expressed understanding.   Roseanne Izquierdo CMA, 06/24/22, 4:09 PM

## 2022-06-24 NOTE — TELEPHONE ENCOUNTER
Because of the combination of diabetes and age over 72 I do recommend Paxlovid. I will send in a prescription for it. In addition, please check oxygen levels several times a day. If the oxygen level is consistently below 90 please go to the emergency department.

## 2022-06-24 NOTE — TELEPHONE ENCOUNTER
Patient Covid19 Positive yesterday. Patient with Low Grade Fever/Dry Cough/Fatigue. Asking if there is medication to help alleviate   Covid19 Symptoms? Patient also advised:  Pulse Ox. Check O2% several times daily. Advised if O2% fall to 90% or below---ER. Patient also advised should he develop any  Chest Pain/SOB--ER/areed.

## 2022-06-24 NOTE — TELEPHONE ENCOUNTER
[place] asking for Symptom Start Date for  Covid19. Informed 6/22/22.   Brent Haro CMA, 06/24/22, 11:40 AM

## 2022-06-24 NOTE — TELEPHONE ENCOUNTER
Please advise if patient is to hold any medications  While on the Paxlovid. Asking about Atorvastatin.   Marilyn Bryant CMA, 06/24/22, 12:43 PM

## 2022-06-27 ENCOUNTER — TELEPHONE (OUTPATIENT)
Dept: FAMILY MEDICINE CLINIC | Facility: CLINIC | Age: 77
End: 2022-06-27

## 2022-06-27 NOTE — TELEPHONE ENCOUNTER
Patient states he is feeling better today. Questions if he should have the IV Antibodies. Patient informed he does not qualify at this time. Patient has not been checking O2%. Does not feel he needs to. Patient again advised to be checking   O2% frequently/Treat symptoms. Again advised if any SOB/Chest Pain/  O2% 90 or below---ER/agreed.

## 2022-07-20 RX ORDER — METFORMIN HYDROCHLORIDE 500 MG/1
TABLET, EXTENDED RELEASE ORAL
Qty: 90 TABLET | Refills: 0 | Status: SHIPPED | OUTPATIENT
Start: 2022-07-20

## 2022-07-20 NOTE — TELEPHONE ENCOUNTER
Future appt: Your appointments     Date & Time Appointment Department [facility])    Aug 16, 2022  8:30 AM CDT Laboratory Visit with REF Reyes Dada [facility] (EDW Ref Lab Leila Garciam)        Aug 17, 2022  2:15 PM CDT Follow Up Visit with Carole Sol MD 25 [facility], Leila Powers ([facility])        Dec 28, 2022 10:30 AM [facility] Medicare Annual Well Visit with Carole Sol MD [address] [place] [address], Leila Powers ([facility])            [address] [place] [address] [address] [phone]  [facility] E [facility] Reference [facility]  EDW Ref Lab [address] [address]        Last Appointment with provider: 12/29/21 with Dr. Danitza Uribe for Annual Physical. Plan for recheck in 6 months. Last appointment at [facility] [place]:  Visit date not found  Cholesterol, Total (mg/dL)   Date Value   12/27/2021 139     HDL Cholesterol (mg/dL)   Date Value   12/27/2021 37 (L)     LDL Cholesterol (mg/dL)   Date Value   12/27/2021 74     Triglycerides (mg/dL)   Date Value   12/27/2021 161 (H)     Lab Results   Component Value Date     (H) 12/27/2021    A1C 6.2 (H) 12/27/2021     Lab Results   Component Value Date    TSH 1.960 12/27/2021       No follow-ups on file.

## 2022-07-25 ENCOUNTER — TELEPHONE (OUTPATIENT)
Dept: FAMILY MEDICINE CLINIC | Facility: CLINIC | Age: 77
End: 2022-07-25

## 2022-07-25 DIAGNOSIS — N52.01 ERECTILE DYSFUNCTION DUE TO ARTERIAL INSUFFICIENCY: Primary | ICD-10-CM

## 2022-07-27 PROBLEM — N52.01 ERECTILE DYSFUNCTION DUE TO ARTERIAL INSUFFICIENCY: Status: ACTIVE | Noted: 2022-07-27

## 2022-07-27 RX ORDER — SILDENAFIL 100 MG/1
100 TABLET, FILM COATED ORAL
Qty: 6 TABLET | Refills: 5 | Status: SHIPPED | OUTPATIENT
Start: 2022-07-27

## 2022-07-27 NOTE — TELEPHONE ENCOUNTER
Patient informed of below. Expressed understanding.   Lencho Galdaemz [facility], 07/27/22, 1:14 PM

## 2022-07-27 NOTE — TELEPHONE ENCOUNTER
Letter received requesting a prescription for Viagra. Plan: Viagra 100 mg sent to the pharmacy. Take 1 as needed to help with erectile dysfunction.

## 2022-08-16 ENCOUNTER — LAB ENCOUNTER (OUTPATIENT)
Dept: LAB | Age: 77
End: 2022-08-16
Attending: FAMILY MEDICINE
Payer: MEDICARE

## 2022-08-16 DIAGNOSIS — E11.21 DIABETIC NEPHROPATHY ASSOCIATED WITH TYPE 2 DIABETES MELLITUS (HCC): ICD-10-CM

## 2022-08-16 LAB
ALBUMIN SERPL-MCNC: 3.7 G/DL (ref 3.4–5)
ALBUMIN/GLOB SERPL: 1.1 {RATIO} (ref 1–2)
ALP LIVER SERPL-CCNC: 64 U/L
ALT SERPL-CCNC: 28 U/L
ANION GAP SERPL CALC-SCNC: 4 MMOL/L (ref 0–18)
AST SERPL-CCNC: 22 U/L (ref 15–37)
BILIRUB SERPL-MCNC: 0.9 MG/DL (ref 0.1–2)
BUN BLD-MCNC: 19 MG/DL (ref 7–18)
CALCIUM BLD-MCNC: 9.5 MG/DL (ref 8.5–10.1)
CHLORIDE SERPL-SCNC: 110 MMOL/L (ref 98–112)
CO2 SERPL-SCNC: 23 MMOL/L (ref 21–32)
CREAT BLD-MCNC: 1.1 MG/DL
EST. AVERAGE GLUCOSE BLD GHB EST-MCNC: 128 MG/DL (ref 68–126)
FASTING STATUS PATIENT QL REPORTED: YES
GFR SERPLBLD BASED ON 1.73 SQ M-ARVRAT: 70 ML/MIN/1.73M2 (ref 60–?)
GLOBULIN PLAS-MCNC: 3.4 G/DL (ref 2.8–4.4)
GLUCOSE BLD-MCNC: 111 MG/DL (ref 70–99)
HBA1C MFR BLD: 6.1 % (ref ?–5.7)
OSMOLALITY SERPL CALC.SUM OF ELEC: 287 MOSM/KG (ref 275–295)
POTASSIUM SERPL-SCNC: 4.4 MMOL/L (ref 3.5–5.1)
PROT SERPL-MCNC: 7.1 G/DL (ref 6.4–8.2)
SODIUM SERPL-SCNC: 137 MMOL/L (ref 136–145)

## 2022-08-16 PROCEDURE — 83036 HEMOGLOBIN GLYCOSYLATED A1C: CPT

## 2022-08-16 PROCEDURE — 36415 COLL VENOUS BLD VENIPUNCTURE: CPT

## 2022-08-16 PROCEDURE — 80053 COMPREHEN METABOLIC PANEL: CPT

## 2022-08-17 ENCOUNTER — OFFICE VISIT (OUTPATIENT)
Dept: FAMILY MEDICINE CLINIC | Facility: CLINIC | Age: 77
End: 2022-08-17
Payer: MEDICARE

## 2022-08-17 VITALS
BODY MASS INDEX: 32.47 KG/M2 | DIASTOLIC BLOOD PRESSURE: 80 MMHG | TEMPERATURE: 99 F | SYSTOLIC BLOOD PRESSURE: 170 MMHG | HEIGHT: 66 IN | HEART RATE: 100 BPM | WEIGHT: 202 LBS

## 2022-08-17 DIAGNOSIS — C61 PROSTATE CANCER (HCC): ICD-10-CM

## 2022-08-17 DIAGNOSIS — Z12.5 ENCOUNTER FOR SCREENING FOR MALIGNANT NEOPLASM OF PROSTATE: ICD-10-CM

## 2022-08-17 DIAGNOSIS — E78.00 PURE HYPERCHOLESTEROLEMIA: ICD-10-CM

## 2022-08-17 DIAGNOSIS — E11.21 DIABETIC NEPHROPATHY ASSOCIATED WITH TYPE 2 DIABETES MELLITUS (HCC): Primary | ICD-10-CM

## 2022-08-17 DIAGNOSIS — I10 BENIGN ESSENTIAL HYPERTENSION: ICD-10-CM

## 2022-08-17 PROCEDURE — 99214 OFFICE O/P EST MOD 30 MIN: CPT | Performed by: FAMILY MEDICINE

## 2022-10-10 RX ORDER — METFORMIN HYDROCHLORIDE 500 MG/1
TABLET, EXTENDED RELEASE ORAL
Qty: 90 TABLET | Refills: 1 | Status: SHIPPED | OUTPATIENT
Start: 2022-10-10

## 2022-10-10 NOTE — TELEPHONE ENCOUNTER
Metformin: 7/20/22   Return in about 4 months (around 12/17/2022). Future appt: Your appointments     Date & Time Appointment Department [facility])    Dec 28, 2022 10:30 AM CST Medicare Annual Well Visit with Jose Gann MD 25 [facility] [place] ([facility])            25 [facility]  [facility] [phone]  [phone]        Last Appointment with provider:   8/17/2022  Last appointment at [facility]:  8/17/2022  Cholesterol, Total (mg/dL)   Date Value   12/27/2021 139     HDL Cholesterol (mg/dL)   Date Value   12/27/2021 37 (L)     LDL Cholesterol (mg/dL)   Date Value   12/27/2021 74     Triglycerides (mg/dL)   Date Value   12/27/2021 161 (H)     Lab Results   Component Value Date     (H) 08/16/2022    A1C 6.1 (H) 08/16/2022     Lab Results   Component Value Date    TSH 1.960 12/27/2021       No follow-ups on file.

## 2022-10-19 RX ORDER — SILDENAFIL 100 MG/1
100 TABLET, FILM COATED ORAL
Qty: 6 TABLET | Refills: 5 | Status: SHIPPED | OUTPATIENT
Start: 2022-10-19

## 2022-10-19 NOTE — TELEPHONE ENCOUNTER
Sildenafil: 7/27/22    Future appt: Your appointments     Date & Time Appointment Department [facility])    Dec 28, 2022 10:30 AM CST Medicare Annual Well Visit with Erich Amaral MD 25 [facility], Dm Ortega ([facility])            25 [facility] [place]  [address] [phone]  [phone]        Last Appointment with provider:   8/17/2022  Last appointment at [facility]:  8/17/2022  Cholesterol, Total (mg/dL)   Date Value   12/27/2021 139     HDL Cholesterol (mg/dL)   Date Value   12/27/2021 37 (L)     LDL Cholesterol (mg/dL)   Date Value   12/27/2021 74     Triglycerides (mg/dL)   Date Value   12/27/2021 161 (H)     Lab Results   Component Value Date     (H) 08/16/2022    A1C 6.1 (H) 08/16/2022     Lab Results   Component Value Date    TSH 1.960 12/27/2021       No follow-ups on file.

## 2022-11-14 DIAGNOSIS — E78.00 PURE HYPERCHOLESTEROLEMIA: ICD-10-CM

## 2022-11-14 RX ORDER — ATORVASTATIN CALCIUM 80 MG/1
40 TABLET, FILM COATED ORAL DAILY
Qty: 45 TABLET | Refills: 1 | Status: SHIPPED | OUTPATIENT
Start: 2022-11-14

## 2022-11-14 NOTE — TELEPHONE ENCOUNTER
Atorvastatin: 5/24/22    Future appt: Your appointments     Date & Time Appointment Department [facility])    Dec 28, 2022 10:30 AM CST Medicare Annual Well Visit with Cynthia Daley MD 25 [facility], Maxi [place]acon ([facility])            25 [facility] [place]  [address] [phone]  [phone]        Last Appointment with provider:   8/17/2022  Last appointment at [facility]:  8/17/2022  Cholesterol, Total (mg/dL)   Date Value   12/27/2021 139     HDL Cholesterol (mg/dL)   Date Value   12/27/2021 37 (L)     LDL Cholesterol (mg/dL)   Date Value   12/27/2021 74     Triglycerides (mg/dL)   Date Value   12/27/2021 161 (H)     Lab Results   Component Value Date     (H) 08/16/2022    A1C 6.1 (H) 08/16/2022     Lab Results   Component Value Date    TSH 1.960 12/27/2021       No follow-ups on file.

## 2022-11-25 NOTE — TELEPHONE ENCOUNTER
Enalapril: 6/13/22    Future appt: Your appointments     Date & Time Appointment Department [facility])    Dec 28, 2022 10:30 AM CST Medicare Annual Well Visit with Salud Tidwell MD 25 [facility], Sarah Beth Zuluaga ([facility])            25 [facility] [place]  [place] [phone]  [phone]        Last Appointment with provider:   8/17/2022  Last appointment at [facility] [place]:  8/17/2022  Cholesterol, Total (mg/dL)   Date Value   12/27/2021 139     HDL Cholesterol (mg/dL)   Date Value   12/27/2021 37 (L)     LDL Cholesterol (mg/dL)   Date Value   12/27/2021 74     Triglycerides (mg/dL)   Date Value   12/27/2021 161 (H)     Lab Results   Component Value Date     (H) 08/16/2022    A1C 6.1 (H) 08/16/2022     Lab Results   Component Value Date    TSH 1.960 12/27/2021       No follow-ups on file.

## 2022-11-28 RX ORDER — ENALAPRIL MALEATE 10 MG/1
TABLET ORAL
Qty: 90 TABLET | Refills: 1 | Status: SHIPPED | OUTPATIENT
Start: 2022-11-28

## 2022-12-02 ENCOUNTER — TELEPHONE (OUTPATIENT)
Dept: FAMILY MEDICINE CLINIC | Facility: CLINIC | Age: 77
End: 2022-12-02

## 2022-12-02 DIAGNOSIS — M25.512 ACUTE PAIN OF LEFT SHOULDER: Primary | ICD-10-CM

## 2022-12-02 NOTE — TELEPHONE ENCOUNTER
Pt has partially  shoulder and needs to get addressed. Pt stated Dr. Katya Saab advised him would refer pt to shoulder specialist.     Please review and advise.

## 2022-12-22 ENCOUNTER — LABORATORY ENCOUNTER (OUTPATIENT)
Dept: LAB | Age: 77
End: 2022-12-22
Attending: FAMILY MEDICINE
Payer: MEDICARE

## 2022-12-22 DIAGNOSIS — Z12.5 ENCOUNTER FOR SCREENING FOR MALIGNANT NEOPLASM OF PROSTATE: ICD-10-CM

## 2022-12-22 DIAGNOSIS — E78.00 PURE HYPERCHOLESTEROLEMIA: ICD-10-CM

## 2022-12-22 DIAGNOSIS — E11.21 DIABETIC NEPHROPATHY ASSOCIATED WITH TYPE 2 DIABETES MELLITUS (HCC): ICD-10-CM

## 2022-12-22 DIAGNOSIS — I10 BENIGN ESSENTIAL HYPERTENSION: ICD-10-CM

## 2022-12-22 DIAGNOSIS — C61 PROSTATE CANCER (HCC): ICD-10-CM

## 2022-12-22 LAB
ALBUMIN SERPL-MCNC: 3.8 G/DL (ref 3.4–5)
ALBUMIN/GLOB SERPL: 1.2 {RATIO} (ref 1–2)
ALP LIVER SERPL-CCNC: 67 U/L
ALT SERPL-CCNC: 27 U/L
ANION GAP SERPL CALC-SCNC: 4 MMOL/L (ref 0–18)
AST SERPL-CCNC: 22 U/L (ref 15–37)
BASOPHILS # BLD AUTO: 0.06 X10(3) UL (ref 0–0.2)
BASOPHILS NFR BLD AUTO: 0.8 %
BILIRUB SERPL-MCNC: 0.9 MG/DL (ref 0.1–2)
BUN BLD-MCNC: 15 MG/DL (ref 7–18)
CALCIUM BLD-MCNC: 9.5 MG/DL (ref 8.5–10.1)
CHLORIDE SERPL-SCNC: 105 MMOL/L (ref 98–112)
CHOLEST SERPL-MCNC: 139 MG/DL (ref ?–200)
CO2 SERPL-SCNC: 27 MMOL/L (ref 21–32)
COMPLEXED PSA SERPL-MCNC: 12.7 NG/ML (ref ?–4)
CREAT BLD-MCNC: 1.01 MG/DL
CREAT UR-SCNC: 31.2 MG/DL
EOSINOPHIL # BLD AUTO: 0.19 X10(3) UL (ref 0–0.7)
EOSINOPHIL NFR BLD AUTO: 2.5 %
ERYTHROCYTE [DISTWIDTH] IN BLOOD BY AUTOMATED COUNT: 13.1 %
EST. AVERAGE GLUCOSE BLD GHB EST-MCNC: 131 MG/DL (ref 68–126)
FASTING PATIENT LIPID ANSWER: YES
FASTING STATUS PATIENT QL REPORTED: YES
GFR SERPLBLD BASED ON 1.73 SQ M-ARVRAT: 77 ML/MIN/1.73M2 (ref 60–?)
GLOBULIN PLAS-MCNC: 3.3 G/DL (ref 2.8–4.4)
GLUCOSE BLD-MCNC: 116 MG/DL (ref 70–99)
HBA1C MFR BLD: 6.2 % (ref ?–5.7)
HCT VFR BLD AUTO: 49.1 %
HDLC SERPL-MCNC: 45 MG/DL (ref 40–59)
HGB BLD-MCNC: 16.3 G/DL
IMM GRANULOCYTES # BLD AUTO: 0.03 X10(3) UL (ref 0–1)
IMM GRANULOCYTES NFR BLD: 0.4 %
LDLC SERPL CALC-MCNC: 71 MG/DL (ref ?–100)
LYMPHOCYTES # BLD AUTO: 1.68 X10(3) UL (ref 1–4)
LYMPHOCYTES NFR BLD AUTO: 22.1 %
MCH RBC QN AUTO: 33.3 PG (ref 26–34)
MCHC RBC AUTO-ENTMCNC: 33.2 G/DL (ref 31–37)
MCV RBC AUTO: 100.2 FL
MICROALBUMIN UR-MCNC: 46.5 MG/DL
MICROALBUMIN/CREAT 24H UR-RTO: 1490.4 UG/MG (ref ?–30)
MONOCYTES # BLD AUTO: 0.71 X10(3) UL (ref 0.1–1)
MONOCYTES NFR BLD AUTO: 9.3 %
NEUTROPHILS # BLD AUTO: 4.94 X10 (3) UL (ref 1.5–7.7)
NEUTROPHILS # BLD AUTO: 4.94 X10(3) UL (ref 1.5–7.7)
NEUTROPHILS NFR BLD AUTO: 64.9 %
NONHDLC SERPL-MCNC: 94 MG/DL (ref ?–130)
OSMOLALITY SERPL CALC.SUM OF ELEC: 284 MOSM/KG (ref 275–295)
PLATELET # BLD AUTO: 250 10(3)UL (ref 150–450)
POTASSIUM SERPL-SCNC: 4.7 MMOL/L (ref 3.5–5.1)
PROT SERPL-MCNC: 7.1 G/DL (ref 6.4–8.2)
RBC # BLD AUTO: 4.9 X10(6)UL
SODIUM SERPL-SCNC: 136 MMOL/L (ref 136–145)
TRIGL SERPL-MCNC: 130 MG/DL (ref 30–149)
TSI SER-ACNC: 3 MIU/ML (ref 0.36–3.74)
URATE SERPL-MCNC: 7.6 MG/DL
VLDLC SERPL CALC-MCNC: 20 MG/DL (ref 0–30)
WBC # BLD AUTO: 7.6 X10(3) UL (ref 4–11)

## 2022-12-22 PROCEDURE — 82570 ASSAY OF URINE CREATININE: CPT

## 2022-12-22 PROCEDURE — 83036 HEMOGLOBIN GLYCOSYLATED A1C: CPT

## 2022-12-22 PROCEDURE — 80053 COMPREHEN METABOLIC PANEL: CPT

## 2022-12-22 PROCEDURE — 85025 COMPLETE CBC W/AUTO DIFF WBC: CPT

## 2022-12-22 PROCEDURE — 36415 COLL VENOUS BLD VENIPUNCTURE: CPT

## 2022-12-22 PROCEDURE — 82043 UR ALBUMIN QUANTITATIVE: CPT

## 2022-12-22 PROCEDURE — 84550 ASSAY OF BLOOD/URIC ACID: CPT

## 2022-12-22 PROCEDURE — 80061 LIPID PANEL: CPT

## 2022-12-22 PROCEDURE — 84443 ASSAY THYROID STIM HORMONE: CPT

## 2022-12-28 ENCOUNTER — OFFICE VISIT (OUTPATIENT)
Dept: FAMILY MEDICINE CLINIC | Facility: CLINIC | Age: 77
End: 2022-12-28
Payer: MEDICARE

## 2022-12-28 VITALS
WEIGHT: 207 LBS | SYSTOLIC BLOOD PRESSURE: 170 MMHG | HEIGHT: 66.5 IN | DIASTOLIC BLOOD PRESSURE: 60 MMHG | HEART RATE: 80 BPM | TEMPERATURE: 99 F | BODY MASS INDEX: 32.87 KG/M2 | RESPIRATION RATE: 16 BRPM

## 2022-12-28 DIAGNOSIS — C61 PROSTATE CANCER (HCC): ICD-10-CM

## 2022-12-28 DIAGNOSIS — M25.512 ACUTE PAIN OF LEFT SHOULDER: ICD-10-CM

## 2022-12-28 DIAGNOSIS — I10 BENIGN ESSENTIAL HYPERTENSION: ICD-10-CM

## 2022-12-28 DIAGNOSIS — S46.012D TRAUMATIC INCOMPLETE TEAR OF LEFT ROTATOR CUFF, SUBSEQUENT ENCOUNTER: ICD-10-CM

## 2022-12-28 DIAGNOSIS — E78.00 PURE HYPERCHOLESTEROLEMIA: ICD-10-CM

## 2022-12-28 DIAGNOSIS — Z12.11 SCREENING FOR COLON CANCER: ICD-10-CM

## 2022-12-28 DIAGNOSIS — K08.109 EDENTULOUS: ICD-10-CM

## 2022-12-28 DIAGNOSIS — Z23 FLU VACCINE NEED: ICD-10-CM

## 2022-12-28 DIAGNOSIS — N52.01 ERECTILE DYSFUNCTION DUE TO ARTERIAL INSUFFICIENCY: ICD-10-CM

## 2022-12-28 DIAGNOSIS — Z00.00 ENCOUNTER FOR ANNUAL HEALTH EXAMINATION: Primary | ICD-10-CM

## 2022-12-28 DIAGNOSIS — R97.20 ELEVATED PSA: ICD-10-CM

## 2022-12-28 DIAGNOSIS — E11.21 DIABETIC NEPHROPATHY ASSOCIATED WITH TYPE 2 DIABETES MELLITUS (HCC): ICD-10-CM

## 2022-12-28 PROCEDURE — G0008 ADMIN INFLUENZA VIRUS VAC: HCPCS | Performed by: FAMILY MEDICINE

## 2022-12-28 PROCEDURE — G0439 PPPS, SUBSEQ VISIT: HCPCS | Performed by: FAMILY MEDICINE

## 2022-12-28 PROCEDURE — 1126F AMNT PAIN NOTED NONE PRSNT: CPT | Performed by: FAMILY MEDICINE

## 2022-12-28 PROCEDURE — 99213 OFFICE O/P EST LOW 20 MIN: CPT | Performed by: FAMILY MEDICINE

## 2022-12-28 PROCEDURE — 90662 IIV NO PRSV INCREASED AG IM: CPT | Performed by: FAMILY MEDICINE

## 2022-12-28 RX ORDER — ENALAPRIL MALEATE 20 MG/1
20 TABLET ORAL DAILY
Qty: 90 TABLET | Refills: 3 | Status: SHIPPED | OUTPATIENT
Start: 2022-12-28

## 2022-12-28 RX ORDER — ATORVASTATIN CALCIUM 80 MG/1
40 TABLET, FILM COATED ORAL DAILY
Qty: 45 TABLET | Refills: 3 | Status: SHIPPED | OUTPATIENT
Start: 2022-12-28

## 2022-12-28 RX ORDER — AMLODIPINE BESYLATE 5 MG/1
5 TABLET ORAL DAILY
Qty: 90 TABLET | Refills: 3 | Status: SHIPPED | OUTPATIENT
Start: 2022-12-28

## 2022-12-28 RX ORDER — METFORMIN HYDROCHLORIDE 500 MG/1
500 TABLET, EXTENDED RELEASE ORAL
Qty: 90 TABLET | Refills: 3 | Status: SHIPPED | OUTPATIENT
Start: 2022-12-28

## 2023-02-03 NOTE — TELEPHONE ENCOUNTER
Sildenafil: 10/19/22    Future appt: Your appointments     Date & Time Appointment Department [facility])    Jun 23, 2023  8:15 AM CDT what is this with REF Roslyn Roberts [place] ([facility] [place])        Jun 28, 2023 10:30 AM CDT Follow Up Visit with Hood Barrios MD [address], [place] ([place])        Dec 08, 2023  8:00 AM CST what is this with REF Roslyn Grant [place] ([facility] [place])        Dec 12, 2023  9:45 AM CST Medicare Annual Well Visit with Hood Barrios MD [address], [place] ([place]asim [place])            Roslyn Milian [place]  ED[facility] [place]  69 [facility] [address]  [address] [facility] [place]  [place] [phone]  [phone]        Last Appointment with provider:   12/28/2022  Last appointment at [facility]:  12/28/2022  Cholesterol, Total (mg/dL)   Date Value   12/22/2022 139     HDL Cholesterol (mg/dL)   Date Value   12/22/2022 45     LDL Cholesterol (mg/dL)   Date Value   12/22/2022 71     Triglycerides (mg/dL)   Date Value   12/22/2022 130     Lab Results   Component Value Date     (H) 12/22/2022    A1C 6.2 (H) 12/22/2022     Lab Results   Component Value Date    TSH 3.000 12/22/2022       No follow-ups on file.

## 2023-02-06 RX ORDER — SILDENAFIL 100 MG/1
TABLET, FILM COATED ORAL
Qty: 6 TABLET | Refills: 5 | Status: SHIPPED | OUTPATIENT
Start: 2023-02-06

## 2023-05-30 DIAGNOSIS — N52.01 ERECTILE DYSFUNCTION DUE TO ARTERIAL INSUFFICIENCY: Primary | ICD-10-CM

## 2023-05-30 NOTE — TELEPHONE ENCOUNTER
Future appt: Your appointments     Date & Time Appointment Department [facility])    Jun 23, 2023  8:15 AM CDT Laboratory Visit with REF 1 Maryuri Carrillo [place] Mcgowan ([facility] [place])        Jun 28, 2023 10:30 AM CDT Follow Up Visit with Enzo Chavira MD [address] ([facility])        Dec 08, 2023  8:00 AM CST Laboratory Visit with REF [place] Lieutenant Lundborg, [place], [place] ([facility] [place])        Dec 12, 2023  9:45 AM CST Medicare Annual Well Visit with Enzo Chavira MD [address] [place] ([facility])            Lieutenant Lundborg, [place], [place]  EDW Ref Lab [place]  [address] [facility] [address]  [address] [facility] [place]  [place] [phone]  [phone]        Last Appointment with provider:   12/28/2022(PX)-f/u 6 mo(6/28/23)  Last appointment at [facility] [place]:  12/28/2022    SILDENAFIL CITRATE 100 MG Oral Tab    6tabs  5refill        Filled:2/6/23 Summary: Take 1 tablet by mouth daily as needed for Erectile Dysfunction          Cholesterol, Total (mg/dL)   Date Value   12/22/2022 139     HDL Cholesterol (mg/dL)   Date Value   12/22/2022 45     LDL Cholesterol (mg/dL)   Date Value   12/22/2022 71     Triglycerides (mg/dL)   Date Value   12/22/2022 130     Lab Results   Component Value Date     (H) 12/22/2022    A1C 6.2 (H) 12/22/2022     Lab Results   Component Value Date    TSH 3.000 12/22/2022       No follow-ups on file.

## 2023-05-31 RX ORDER — SILDENAFIL 100 MG/1
100 TABLET, FILM COATED ORAL DAILY PRN
Qty: 6 TABLET | Refills: 5 | Status: SHIPPED | OUTPATIENT
Start: 2023-05-31

## 2023-06-23 ENCOUNTER — LABORATORY ENCOUNTER (OUTPATIENT)
Dept: LAB | Age: 78
End: 2023-06-23
Attending: FAMILY MEDICINE
Payer: MEDICARE

## 2023-06-23 DIAGNOSIS — E11.21 DIABETIC NEPHROPATHY ASSOCIATED WITH TYPE 2 DIABETES MELLITUS (HCC): ICD-10-CM

## 2023-06-23 LAB
ALBUMIN SERPL-MCNC: 3.7 G/DL (ref 3.4–5)
ALBUMIN/GLOB SERPL: 1.1 {RATIO} (ref 1–2)
ALP LIVER SERPL-CCNC: 104 U/L
ALT SERPL-CCNC: 38 U/L
ANION GAP SERPL CALC-SCNC: 7 MMOL/L (ref 0–18)
AST SERPL-CCNC: 19 U/L (ref 15–37)
BILIRUB SERPL-MCNC: 0.6 MG/DL (ref 0.1–2)
BUN BLD-MCNC: 27 MG/DL (ref 7–18)
CALCIUM BLD-MCNC: 9.1 MG/DL (ref 8.5–10.1)
CHLORIDE SERPL-SCNC: 109 MMOL/L (ref 98–112)
CO2 SERPL-SCNC: 22 MMOL/L (ref 21–32)
CREAT BLD-MCNC: 1.1 MG/DL
EST. AVERAGE GLUCOSE BLD GHB EST-MCNC: 140 MG/DL (ref 68–126)
FASTING STATUS PATIENT QL REPORTED: YES
GFR SERPLBLD BASED ON 1.73 SQ M-ARVRAT: 69 ML/MIN/1.73M2 (ref 60–?)
GLOBULIN PLAS-MCNC: 3.4 G/DL (ref 2.8–4.4)
GLUCOSE BLD-MCNC: 131 MG/DL (ref 70–99)
HBA1C MFR BLD: 6.5 % (ref ?–5.7)
OSMOLALITY SERPL CALC.SUM OF ELEC: 293 MOSM/KG (ref 275–295)
POTASSIUM SERPL-SCNC: 4.1 MMOL/L (ref 3.5–5.1)
PROT SERPL-MCNC: 7.1 G/DL (ref 6.4–8.2)
SODIUM SERPL-SCNC: 138 MMOL/L (ref 136–145)

## 2023-06-23 PROCEDURE — 80053 COMPREHEN METABOLIC PANEL: CPT

## 2023-06-23 PROCEDURE — 83036 HEMOGLOBIN GLYCOSYLATED A1C: CPT

## 2023-06-23 PROCEDURE — 36415 COLL VENOUS BLD VENIPUNCTURE: CPT

## 2023-06-26 ENCOUNTER — TELEPHONE (OUTPATIENT)
Dept: FAMILY MEDICINE CLINIC | Facility: CLINIC | Age: 78
End: 2023-06-26

## 2023-06-28 ENCOUNTER — OFFICE VISIT (OUTPATIENT)
Dept: FAMILY MEDICINE CLINIC | Facility: CLINIC | Age: 78
End: 2023-06-28
Payer: MEDICARE

## 2023-06-28 VITALS
WEIGHT: 207.81 LBS | RESPIRATION RATE: 18 BRPM | HEIGHT: 66 IN | TEMPERATURE: 98 F | SYSTOLIC BLOOD PRESSURE: 138 MMHG | BODY MASS INDEX: 33.4 KG/M2 | HEART RATE: 74 BPM | DIASTOLIC BLOOD PRESSURE: 77 MMHG | OXYGEN SATURATION: 96 %

## 2023-06-28 DIAGNOSIS — S46.012D TRAUMATIC INCOMPLETE TEAR OF LEFT ROTATOR CUFF, SUBSEQUENT ENCOUNTER: ICD-10-CM

## 2023-06-28 DIAGNOSIS — I10 BENIGN ESSENTIAL HYPERTENSION: ICD-10-CM

## 2023-06-28 DIAGNOSIS — C61 PROSTATE CANCER (HCC): ICD-10-CM

## 2023-06-28 DIAGNOSIS — E11.21 DIABETIC NEPHROPATHY ASSOCIATED WITH TYPE 2 DIABETES MELLITUS (HCC): Primary | ICD-10-CM

## 2023-06-28 DIAGNOSIS — E78.00 PURE HYPERCHOLESTEROLEMIA: ICD-10-CM

## 2023-06-28 PROBLEM — M25.512 ACUTE PAIN OF LEFT SHOULDER: Status: RESOLVED | Noted: 2021-12-29 | Resolved: 2023-06-28

## 2023-06-28 PROCEDURE — 99214 OFFICE O/P EST MOD 30 MIN: CPT | Performed by: FAMILY MEDICINE

## 2023-07-21 ENCOUNTER — TELEPHONE (OUTPATIENT)
Dept: FAMILY MEDICINE CLINIC | Facility: CLINIC | Age: 78
End: 2023-07-21

## 2023-08-18 NOTE — TELEPHONE ENCOUNTER
Pt called and infomed that medications have been refilled. Instructed pt to call to make appt with Dr. Hever Munson before any more refills. Pt understood and will call back to make appt. Pt is already scheduled       Procedure: Ambulatory referral/consult to Endo Procedure  Status: Needs Scheduling (Sent to Patient)     Requested appt date: 3/21/2023 Authorizing: Constantino Olguin MD in [facility] ENDOSCOPY 2ND [facility]     Referral: [id number] (Authorized)         Expires: 9/20/2023 Priority: Routine     Assign to: YUKI MORROW       Diagnosis: Gastric polyp [K31.7]      Order Specific Questions     What procedure is to be performed?     EGD        CPT Code:     DC EGD, FLEX, DIAGNOSTIC [93423]